# Patient Record
Sex: FEMALE | Race: WHITE | NOT HISPANIC OR LATINO | URBAN - METROPOLITAN AREA
[De-identification: names, ages, dates, MRNs, and addresses within clinical notes are randomized per-mention and may not be internally consistent; named-entity substitution may affect disease eponyms.]

---

## 2018-02-13 ENCOUNTER — OFFICE VISIT CONVERTED (OUTPATIENT)
Dept: FAMILY MEDICINE CLINIC | Facility: CLINIC | Age: 23
End: 2018-02-13
Attending: NURSE PRACTITIONER

## 2018-02-13 ENCOUNTER — CONVERSION ENCOUNTER (OUTPATIENT)
Dept: FAMILY MEDICINE CLINIC | Facility: CLINIC | Age: 23
End: 2018-02-13

## 2018-03-09 ENCOUNTER — CONVERSION ENCOUNTER (OUTPATIENT)
Dept: FAMILY MEDICINE CLINIC | Facility: CLINIC | Age: 23
End: 2018-03-09

## 2018-03-09 ENCOUNTER — OFFICE VISIT CONVERTED (OUTPATIENT)
Dept: FAMILY MEDICINE CLINIC | Facility: CLINIC | Age: 23
End: 2018-03-09
Attending: NURSE PRACTITIONER

## 2018-03-20 ENCOUNTER — CONVERSION ENCOUNTER (OUTPATIENT)
Dept: CARDIOLOGY | Facility: CLINIC | Age: 23
End: 2018-03-20

## 2018-03-20 ENCOUNTER — OFFICE VISIT CONVERTED (OUTPATIENT)
Dept: CARDIOLOGY | Facility: CLINIC | Age: 23
End: 2018-03-20
Attending: INTERNAL MEDICINE

## 2018-05-03 ENCOUNTER — OFFICE VISIT CONVERTED (OUTPATIENT)
Dept: CARDIOLOGY | Facility: CLINIC | Age: 23
End: 2018-05-03
Attending: INTERNAL MEDICINE

## 2018-10-01 ENCOUNTER — OFFICE VISIT CONVERTED (OUTPATIENT)
Dept: FAMILY MEDICINE CLINIC | Facility: CLINIC | Age: 23
End: 2018-10-01
Attending: NURSE PRACTITIONER

## 2018-12-03 ENCOUNTER — CONVERSION ENCOUNTER (OUTPATIENT)
Dept: FAMILY MEDICINE CLINIC | Facility: CLINIC | Age: 23
End: 2018-12-03

## 2018-12-03 ENCOUNTER — OFFICE VISIT CONVERTED (OUTPATIENT)
Dept: FAMILY MEDICINE CLINIC | Facility: CLINIC | Age: 23
End: 2018-12-03
Attending: FAMILY MEDICINE

## 2018-12-13 ENCOUNTER — OFFICE VISIT CONVERTED (OUTPATIENT)
Dept: CARDIOLOGY | Facility: CLINIC | Age: 23
End: 2018-12-13
Attending: INTERNAL MEDICINE

## 2019-02-07 ENCOUNTER — OFFICE VISIT CONVERTED (OUTPATIENT)
Dept: FAMILY MEDICINE CLINIC | Facility: CLINIC | Age: 24
End: 2019-02-07
Attending: FAMILY MEDICINE

## 2019-11-06 ENCOUNTER — HOSPITAL ENCOUNTER (OUTPATIENT)
Dept: FAMILY MEDICINE CLINIC | Facility: CLINIC | Age: 24
Discharge: HOME | End: 2019-11-06
Attending: NURSE PRACTITIONER

## 2019-11-06 ENCOUNTER — OFFICE VISIT CONVERTED (OUTPATIENT)
Dept: FAMILY MEDICINE CLINIC | Facility: CLINIC | Age: 24
End: 2019-11-06
Attending: NURSE PRACTITIONER

## 2019-11-07 ENCOUNTER — HOSPITAL ENCOUNTER (OUTPATIENT)
Dept: OTHER | Facility: HOSPITAL | Age: 24
Discharge: HOME | End: 2019-11-07
Attending: NURSE PRACTITIONER

## 2019-12-09 ENCOUNTER — OFFICE VISIT CONVERTED (OUTPATIENT)
Dept: FAMILY MEDICINE CLINIC | Facility: CLINIC | Age: 24
End: 2019-12-09
Attending: NURSE PRACTITIONER

## 2020-03-05 ENCOUNTER — OFFICE VISIT CONVERTED (OUTPATIENT)
Dept: FAMILY MEDICINE CLINIC | Facility: CLINIC | Age: 25
End: 2020-03-05
Attending: FAMILY MEDICINE

## 2020-03-05 ENCOUNTER — CONVERSION ENCOUNTER (OUTPATIENT)
Dept: FAMILY MEDICINE CLINIC | Facility: CLINIC | Age: 25
End: 2020-03-05

## 2020-05-04 ENCOUNTER — HOSPITAL ENCOUNTER (OUTPATIENT)
Dept: FAMILY MEDICINE CLINIC | Facility: CLINIC | Age: 25
Discharge: HOME | End: 2020-05-04
Attending: FAMILY MEDICINE

## 2020-05-04 ENCOUNTER — TELEMEDICINE CONVERTED (OUTPATIENT)
Dept: FAMILY MEDICINE CLINIC | Facility: CLINIC | Age: 25
End: 2020-05-04
Attending: FAMILY MEDICINE

## 2020-05-05 LAB
ALBUMIN SERPL-MCNC: 4.5 G/DL (ref 3.5–5)
ALBUMIN/GLOB SERPL: 1.6 {RATIO} (ref 1.4–2.6)
ALP SERPL-CCNC: 51 U/L (ref 42–98)
ALT SERPL-CCNC: 23 U/L (ref 10–40)
ANION GAP SERPL CALC-SCNC: 18 MMOL/L (ref 8–19)
AST SERPL-CCNC: 29 U/L (ref 15–50)
BASOPHILS # BLD AUTO: 0.06 10*3/UL (ref 0–0.2)
BASOPHILS NFR BLD AUTO: 1.2 % (ref 0–3)
BILIRUB SERPL-MCNC: 0.65 MG/DL (ref 0.2–1.3)
BUN SERPL-MCNC: 10 MG/DL (ref 5–25)
BUN/CREAT SERPL: 11 {RATIO} (ref 6–20)
CALCIUM SERPL-MCNC: 9.6 MG/DL (ref 8.7–10.4)
CHLORIDE SERPL-SCNC: 102 MMOL/L (ref 99–111)
CONV ABS IMM GRAN: 0.01 10*3/UL (ref 0–0.2)
CONV CO2: 25 MMOL/L (ref 22–32)
CONV IMMATURE GRAN: 0.2 % (ref 0–1.8)
CONV TOTAL PROTEIN: 7.4 G/DL (ref 6.3–8.2)
CREAT UR-MCNC: 0.95 MG/DL (ref 0.5–0.9)
DEPRECATED RDW RBC AUTO: 37.2 FL (ref 36.4–46.3)
EOSINOPHIL # BLD AUTO: 0.46 10*3/UL (ref 0–0.7)
EOSINOPHIL # BLD AUTO: 8.9 % (ref 0–7)
ERYTHROCYTE [DISTWIDTH] IN BLOOD BY AUTOMATED COUNT: 11.6 % (ref 11.7–14.4)
GFR SERPLBLD BASED ON 1.73 SQ M-ARVRAT: >60 ML/MIN/{1.73_M2}
GLOBULIN UR ELPH-MCNC: 2.9 G/DL (ref 2–3.5)
GLUCOSE SERPL-MCNC: 86 MG/DL (ref 65–99)
HCT VFR BLD AUTO: 44.6 % (ref 37–47)
HGB BLD-MCNC: 14.7 G/DL (ref 12–16)
LYMPHOCYTES # BLD AUTO: 1.17 10*3/UL (ref 1–5)
LYMPHOCYTES NFR BLD AUTO: 22.8 % (ref 20–45)
MCH RBC QN AUTO: 28.8 PG (ref 27–31)
MCHC RBC AUTO-ENTMCNC: 33 G/DL (ref 33–37)
MCV RBC AUTO: 87.5 FL (ref 81–99)
MONOCYTES # BLD AUTO: 0.62 10*3/UL (ref 0.2–1.2)
MONOCYTES NFR BLD AUTO: 12.1 % (ref 3–10)
NEUTROPHILS # BLD AUTO: 2.82 10*3/UL (ref 2–8)
NEUTROPHILS NFR BLD AUTO: 54.8 % (ref 30–85)
NRBC CBCN: 0 % (ref 0–0.7)
OSMOLALITY SERPL CALC.SUM OF ELEC: 288 MOSM/KG (ref 273–304)
PLATELET # BLD AUTO: 245 10*3/UL (ref 130–400)
PMV BLD AUTO: 9.2 FL (ref 9.4–12.3)
POTASSIUM SERPL-SCNC: 4.7 MMOL/L (ref 3.5–5.3)
RBC # BLD AUTO: 5.1 10*6/UL (ref 4.2–5.4)
SODIUM SERPL-SCNC: 140 MMOL/L (ref 135–147)
WBC # BLD AUTO: 5.14 10*3/UL (ref 4.8–10.8)

## 2020-05-07 LAB — BACTERIA UR CULT: NORMAL

## 2020-09-16 ENCOUNTER — OFFICE VISIT CONVERTED (OUTPATIENT)
Dept: FAMILY MEDICINE CLINIC | Facility: CLINIC | Age: 25
End: 2020-09-16
Attending: FAMILY MEDICINE

## 2021-05-09 VITALS
HEART RATE: 108 BPM | OXYGEN SATURATION: 98 % | SYSTOLIC BLOOD PRESSURE: 124 MMHG | TEMPERATURE: 96.8 F | DIASTOLIC BLOOD PRESSURE: 80 MMHG | WEIGHT: 222 LBS

## 2021-05-09 VITALS — HEART RATE: 95 BPM | DIASTOLIC BLOOD PRESSURE: 74 MMHG | SYSTOLIC BLOOD PRESSURE: 102 MMHG

## 2021-05-09 VITALS
TEMPERATURE: 97.2 F | WEIGHT: 196 LBS | HEART RATE: 117 BPM | OXYGEN SATURATION: 95 % | SYSTOLIC BLOOD PRESSURE: 118 MMHG | DIASTOLIC BLOOD PRESSURE: 72 MMHG

## 2021-05-09 VITALS
BODY MASS INDEX: 37.15 KG/M2 | TEMPERATURE: 97.8 F | SYSTOLIC BLOOD PRESSURE: 110 MMHG | HEART RATE: 124 BPM | DIASTOLIC BLOOD PRESSURE: 68 MMHG | OXYGEN SATURATION: 96 % | WEIGHT: 223 LBS | HEIGHT: 65 IN

## 2021-05-09 VITALS
TEMPERATURE: 99 F | SYSTOLIC BLOOD PRESSURE: 122 MMHG | HEIGHT: 65 IN | OXYGEN SATURATION: 98 % | WEIGHT: 223 LBS | BODY MASS INDEX: 37.15 KG/M2 | HEART RATE: 89 BPM | DIASTOLIC BLOOD PRESSURE: 72 MMHG

## 2021-05-09 VITALS
TEMPERATURE: 96.8 F | WEIGHT: 228.5 LBS | SYSTOLIC BLOOD PRESSURE: 112 MMHG | DIASTOLIC BLOOD PRESSURE: 70 MMHG | HEART RATE: 99 BPM | OXYGEN SATURATION: 96 %

## 2021-05-09 VITALS
SYSTOLIC BLOOD PRESSURE: 138 MMHG | DIASTOLIC BLOOD PRESSURE: 90 MMHG | OXYGEN SATURATION: 98 % | TEMPERATURE: 97.3 F | HEART RATE: 100 BPM | WEIGHT: 223 LBS

## 2021-05-09 VITALS
HEART RATE: 140 BPM | OXYGEN SATURATION: 98 % | WEIGHT: 221.37 LBS | DIASTOLIC BLOOD PRESSURE: 70 MMHG | TEMPERATURE: 97.1 F | SYSTOLIC BLOOD PRESSURE: 116 MMHG

## 2021-05-09 VITALS
OXYGEN SATURATION: 98 % | WEIGHT: 226 LBS | TEMPERATURE: 96.9 F | DIASTOLIC BLOOD PRESSURE: 80 MMHG | SYSTOLIC BLOOD PRESSURE: 120 MMHG | HEART RATE: 108 BPM

## 2021-05-11 VITALS
TEMPERATURE: 97.4 F | WEIGHT: 193 LBS | HEART RATE: 96 BPM | HEIGHT: 65 IN | SYSTOLIC BLOOD PRESSURE: 108 MMHG | OXYGEN SATURATION: 98 % | BODY MASS INDEX: 32.15 KG/M2 | DIASTOLIC BLOOD PRESSURE: 72 MMHG

## 2021-05-15 VITALS
DIASTOLIC BLOOD PRESSURE: 84 MMHG | WEIGHT: 223 LBS | BODY MASS INDEX: 37.15 KG/M2 | HEIGHT: 65 IN | SYSTOLIC BLOOD PRESSURE: 116 MMHG | HEART RATE: 82 BPM

## 2021-05-16 VITALS
HEART RATE: 92 BPM | BODY MASS INDEX: 34.32 KG/M2 | HEIGHT: 65 IN | WEIGHT: 206 LBS | SYSTOLIC BLOOD PRESSURE: 120 MMHG | DIASTOLIC BLOOD PRESSURE: 88 MMHG

## 2021-05-16 VITALS
SYSTOLIC BLOOD PRESSURE: 120 MMHG | HEIGHT: 65 IN | WEIGHT: 202 LBS | DIASTOLIC BLOOD PRESSURE: 80 MMHG | BODY MASS INDEX: 33.66 KG/M2 | HEART RATE: 92 BPM

## 2021-08-27 ENCOUNTER — OFFICE VISIT (OUTPATIENT)
Dept: FAMILY MEDICINE CLINIC | Facility: CLINIC | Age: 26
End: 2021-08-27

## 2021-08-27 VITALS
OXYGEN SATURATION: 95 % | HEART RATE: 81 BPM | TEMPERATURE: 97.7 F | WEIGHT: 223.4 LBS | DIASTOLIC BLOOD PRESSURE: 82 MMHG | SYSTOLIC BLOOD PRESSURE: 120 MMHG | BODY MASS INDEX: 37.18 KG/M2

## 2021-08-27 DIAGNOSIS — R10.12 LUQ ABDOMINAL PAIN: ICD-10-CM

## 2021-08-27 DIAGNOSIS — N39.0 URINARY TRACT INFECTION WITHOUT HEMATURIA, SITE UNSPECIFIED: ICD-10-CM

## 2021-08-27 DIAGNOSIS — E04.1 THYROID NODULE: Primary | ICD-10-CM

## 2021-08-27 DIAGNOSIS — J02.9 PHARYNGITIS, UNSPECIFIED ETIOLOGY: ICD-10-CM

## 2021-08-27 DIAGNOSIS — N83.202 CYST OF LEFT OVARY: ICD-10-CM

## 2021-08-27 DIAGNOSIS — H66.92 LEFT OTITIS MEDIA, UNSPECIFIED OTITIS MEDIA TYPE: ICD-10-CM

## 2021-08-27 DIAGNOSIS — R16.1 SPLENOMEGALY: ICD-10-CM

## 2021-08-27 PROBLEM — J45.909 ASTHMA: Status: ACTIVE | Noted: 2017-02-16

## 2021-08-27 PROBLEM — F32.A DEPRESSION: Status: ACTIVE | Noted: 2021-08-27

## 2021-08-27 PROBLEM — J30.2 SEASONAL ALLERGIC RHINITIS: Status: ACTIVE | Noted: 2021-08-27

## 2021-08-27 PROBLEM — R55 NEUROCARDIOGENIC SYNCOPE: Status: ACTIVE | Noted: 2018-09-20

## 2021-08-27 PROBLEM — F41.9 ANXIETY: Status: ACTIVE | Noted: 2021-08-27

## 2021-08-27 PROBLEM — Z97.5 IUD (INTRAUTERINE DEVICE) IN PLACE: Status: ACTIVE | Noted: 2017-11-17

## 2021-08-27 PROBLEM — K21.9 GERD (GASTROESOPHAGEAL REFLUX DISEASE): Status: ACTIVE | Noted: 2021-08-27

## 2021-08-27 PROBLEM — G43.909 MIGRAINES: Status: ACTIVE | Noted: 2021-08-27

## 2021-08-27 LAB
ALBUMIN SERPL-MCNC: 4.7 G/DL (ref 3.5–5.2)
ALBUMIN/GLOB SERPL: 1.6 G/DL
ALP SERPL-CCNC: 53 U/L (ref 39–117)
ALT SERPL W P-5'-P-CCNC: 17 U/L (ref 1–33)
ANION GAP SERPL CALCULATED.3IONS-SCNC: 8.2 MMOL/L (ref 5–15)
AST SERPL-CCNC: 20 U/L (ref 1–32)
BASOPHILS # BLD AUTO: 0.04 10*3/MM3 (ref 0–0.2)
BASOPHILS NFR BLD AUTO: 0.8 % (ref 0–1.5)
BILIRUB BLD-MCNC: NEGATIVE MG/DL
BILIRUB SERPL-MCNC: 0.5 MG/DL (ref 0–1.2)
BUN SERPL-MCNC: 9 MG/DL (ref 6–20)
BUN/CREAT SERPL: 8.9 (ref 7–25)
CALCIUM SPEC-SCNC: 9.7 MG/DL (ref 8.6–10.5)
CHLORIDE SERPL-SCNC: 101 MMOL/L (ref 98–107)
CLARITY, POC: CLEAR
CO2 SERPL-SCNC: 25.8 MMOL/L (ref 22–29)
COLOR UR: ABNORMAL
CREAT SERPL-MCNC: 1.01 MG/DL (ref 0.57–1)
DEPRECATED RDW RBC AUTO: 38.3 FL (ref 37–54)
EOSINOPHIL # BLD AUTO: 0.3 10*3/MM3 (ref 0–0.4)
EOSINOPHIL NFR BLD AUTO: 6.2 % (ref 0.3–6.2)
ERYTHROCYTE [DISTWIDTH] IN BLOOD BY AUTOMATED COUNT: 11.8 % (ref 12.3–15.4)
GFR SERPL CREATININE-BSD FRML MDRD: 65 ML/MIN/1.73
GLOBULIN UR ELPH-MCNC: 3 GM/DL
GLUCOSE SERPL-MCNC: 89 MG/DL (ref 65–99)
GLUCOSE UR STRIP-MCNC: NEGATIVE MG/DL
HCT VFR BLD AUTO: 46.5 % (ref 34–46.6)
HGB BLD-MCNC: 15.8 G/DL (ref 12–15.9)
IMM GRANULOCYTES # BLD AUTO: 0.01 10*3/MM3 (ref 0–0.05)
IMM GRANULOCYTES NFR BLD AUTO: 0.2 % (ref 0–0.5)
KETONES UR QL: NEGATIVE
LEUKOCYTE EST, POC: ABNORMAL
LYMPHOCYTES # BLD AUTO: 1.76 10*3/MM3 (ref 0.7–3.1)
LYMPHOCYTES NFR BLD AUTO: 36.1 % (ref 19.6–45.3)
MCH RBC QN AUTO: 29.8 PG (ref 26.6–33)
MCHC RBC AUTO-ENTMCNC: 34 G/DL (ref 31.5–35.7)
MCV RBC AUTO: 87.6 FL (ref 79–97)
MONOCYTES # BLD AUTO: 0.47 10*3/MM3 (ref 0.1–0.9)
MONOCYTES NFR BLD AUTO: 9.7 % (ref 5–12)
NEUTROPHILS NFR BLD AUTO: 2.29 10*3/MM3 (ref 1.7–7)
NEUTROPHILS NFR BLD AUTO: 47 % (ref 42.7–76)
NITRITE UR-MCNC: NEGATIVE MG/ML
NRBC BLD AUTO-RTO: 0 /100 WBC (ref 0–0.2)
PH UR: 5.5 [PH] (ref 5–8)
PLATELET # BLD AUTO: 252 10*3/MM3 (ref 140–450)
PMV BLD AUTO: 8.9 FL (ref 6–12)
POTASSIUM SERPL-SCNC: 3.9 MMOL/L (ref 3.5–5.2)
PROT SERPL-MCNC: 7.7 G/DL (ref 6–8.5)
PROT UR STRIP-MCNC: NEGATIVE MG/DL
RBC # BLD AUTO: 5.31 10*6/MM3 (ref 3.77–5.28)
RBC # UR STRIP: NEGATIVE /UL
SODIUM SERPL-SCNC: 135 MMOL/L (ref 136–145)
SP GR UR: 1.02 (ref 1–1.03)
T4 FREE SERPL-MCNC: 1.05 NG/DL (ref 0.93–1.7)
TSH SERPL DL<=0.05 MIU/L-ACNC: 1.12 UIU/ML (ref 0.27–4.2)
UROBILINOGEN UR QL: NORMAL
WBC # BLD AUTO: 4.87 10*3/MM3 (ref 3.4–10.8)

## 2021-08-27 PROCEDURE — 96372 THER/PROPH/DIAG INJ SC/IM: CPT | Performed by: FAMILY MEDICINE

## 2021-08-27 PROCEDURE — 86664 EPSTEIN-BARR NUCLEAR ANTIGEN: CPT | Performed by: FAMILY MEDICINE

## 2021-08-27 PROCEDURE — 84439 ASSAY OF FREE THYROXINE: CPT | Performed by: FAMILY MEDICINE

## 2021-08-27 PROCEDURE — 85025 COMPLETE CBC W/AUTO DIFF WBC: CPT | Performed by: FAMILY MEDICINE

## 2021-08-27 PROCEDURE — 84443 ASSAY THYROID STIM HORMONE: CPT | Performed by: FAMILY MEDICINE

## 2021-08-27 PROCEDURE — U0003 INFECTIOUS AGENT DETECTION BY NUCLEIC ACID (DNA OR RNA); SEVERE ACUTE RESPIRATORY SYNDROME CORONAVIRUS 2 (SARS-COV-2) (CORONAVIRUS DISEASE [COVID-19]), AMPLIFIED PROBE TECHNIQUE, MAKING USE OF HIGH THROUGHPUT TECHNOLOGIES AS DESCRIBED BY CMS-2020-01-R: HCPCS | Performed by: FAMILY MEDICINE

## 2021-08-27 PROCEDURE — 99214 OFFICE O/P EST MOD 30 MIN: CPT | Performed by: FAMILY MEDICINE

## 2021-08-27 PROCEDURE — 80053 COMPREHEN METABOLIC PANEL: CPT | Performed by: FAMILY MEDICINE

## 2021-08-27 PROCEDURE — 87086 URINE CULTURE/COLONY COUNT: CPT | Performed by: FAMILY MEDICINE

## 2021-08-27 PROCEDURE — 86665 EPSTEIN-BARR CAPSID VCA: CPT | Performed by: FAMILY MEDICINE

## 2021-08-27 RX ORDER — CLINDAMYCIN HYDROCHLORIDE 300 MG/1
300 CAPSULE ORAL 4 TIMES DAILY
Qty: 28 CAPSULE | Refills: 0 | Status: SHIPPED | OUTPATIENT
Start: 2021-08-27 | End: 2021-09-03

## 2021-08-27 RX ORDER — HYDROCODONE BITARTRATE AND ACETAMINOPHEN 7.5; 325 MG/1; MG/1
TABLET ORAL
COMMUNITY
Start: 2021-08-25 | End: 2021-10-20

## 2021-08-27 RX ORDER — SULFAMETHOXAZOLE AND TRIMETHOPRIM 800; 160 MG/1; MG/1
TABLET ORAL
COMMUNITY
Start: 2021-08-25 | End: 2021-10-20

## 2021-08-27 RX ORDER — IBUPROFEN 800 MG/1
800 TABLET ORAL EVERY 8 HOURS PRN
Qty: 21 TABLET | Refills: 0 | Status: SHIPPED | OUTPATIENT
Start: 2021-08-27 | End: 2021-11-16

## 2021-08-27 RX ORDER — SACCHAROMYCES BOULARDII 250 MG
250 CAPSULE ORAL 2 TIMES DAILY
Qty: 20 CAPSULE | Refills: 0 | Status: SHIPPED | OUTPATIENT
Start: 2021-08-27 | End: 2021-09-06

## 2021-08-27 RX ORDER — ONDANSETRON 4 MG/1
4 TABLET, FILM COATED ORAL 4 TIMES DAILY PRN
Qty: 28 TABLET | Refills: 1 | Status: SHIPPED | OUTPATIENT
Start: 2021-08-27 | End: 2021-11-16

## 2021-08-27 RX ORDER — FEXOFENADINE HCL 180 MG/1
180 TABLET ORAL DAILY
COMMUNITY

## 2021-08-27 RX ORDER — KETOROLAC TROMETHAMINE 30 MG/ML
60 INJECTION, SOLUTION INTRAMUSCULAR; INTRAVENOUS ONCE
Status: COMPLETED | OUTPATIENT
Start: 2021-08-27 | End: 2021-08-27

## 2021-08-27 RX ADMIN — KETOROLAC TROMETHAMINE 60 MG: 30 INJECTION, SOLUTION INTRAMUSCULAR; INTRAVENOUS at 15:10

## 2021-08-27 NOTE — PROGRESS NOTES
Venipuncture Blood Specimen Collection  Venipuncture performed in left arm by Joann Simental with good hemostasis. Patient tolerated the procedure well without complications.   08/27/21   Joann Simental

## 2021-08-27 NOTE — PROGRESS NOTES
Chief Complaint  Hospital Follow Up Visit ([place], abdominal pain.)    Subjective          Charlotte Paredes presents to [facility]  Pt began having LUQ abdominal pain 3 days ago and was seen at [facility]- pt was diagnosed with UTI, and splenomegaly- which was seen on CT along with thyroid nodule and left 4cm ovarian cyst- pt says she gets a lot of cysts- she said that hospital told her to get it rechecked in 6 weeks  Abdominal Pain  This is a new problem. The current episode started in the past 7 days. The onset quality is sudden. The problem occurs constantly. The problem has been unchanged. The pain is located in the LUQ. The pain is moderate. The quality of the pain is sharp. The abdominal pain does not radiate. Associated symptoms include nausea. Pertinent negatives include no anorexia, arthralgias, belching, constipation, diarrhea, dysuria, fever, flatus, frequency, headaches, hematochezia, melena, myalgias, vomiting or weight loss. Nothing aggravates the pain.       Objective   Allergies   Allergen Reactions   • Cefaclor Hives     Reaction as a child         There is no immunization history on file for this patient.    Vital Signs:   Vitals:    08/27/21 1401   BP: 120/82   Pulse: 81   Temp: 97.7 °F (36.5 °C)   SpO2: 95%   Weight: 101 kg (223 lb 6.4 oz)       Physical Exam  Vitals reviewed.   Constitutional:       Appearance: Normal appearance. She is well-developed.   HENT:      Head: Normocephalic and atraumatic.      Right Ear: Tympanic membrane, ear canal and external ear normal.      Left Ear: External ear normal.      Ears:      Comments: Left TM red, cloudy     Mouth/Throat:      Mouth: Mucous membranes are moist.      Pharynx: Posterior oropharyngeal erythema present. No oropharyngeal exudate.   Eyes:      Extraocular Movements: Extraocular movements intact.      Conjunctiva/sclera: Conjunctivae normal.      Pupils: Pupils are equal, round, and reactive  to light.   Cardiovascular:      Rate and Rhythm: Normal rate and regular rhythm.      Pulses: Normal pulses.      Heart sounds: Normal heart sounds. No murmur heard.   No friction rub. No gallop.    Pulmonary:      Effort: Pulmonary effort is normal.      Breath sounds: Normal breath sounds. No wheezing or rhonchi.   Abdominal:      General: Bowel sounds are normal. There is no distension.      Palpations: Abdomen is soft. There is no mass.      Tenderness: There is abdominal tenderness. There is no right CVA tenderness, left CVA tenderness, guarding or rebound.      Hernia: No hernia is present.      Comments: Tenderness LUQ abdomen   Skin:     General: Skin is warm and dry.      Capillary Refill: Capillary refill takes less than 2 seconds.   Neurological:      Mental Status: She is alert and oriented to person, place, and time.      Cranial Nerves: No cranial nerve deficit.   Psychiatric:         Mood and Affect: Mood and affect normal.         Behavior: Behavior normal.         Thought Content: Thought content normal.         Judgment: Judgment normal.        Result Review :                 Assessment and Plan    Diagnoses and all orders for this visit:    1. Thyroid nodule (Primary)  -     US Thyroid  -     TSH+Free T4    2. Cyst of left ovary  -     US Non-ob Transvaginal; Future    3. Splenomegaly  -     Cancel: Ambulatory Referral to General Surgery  -     EBV Antibody Profile; Future  -     CBC Auto Differential  -     Comprehensive Metabolic Panel  -     EBV Antibody Profile  -     Ambulatory Referral to General Surgery    4. LUQ abdominal pain  -     Cancel: Ambulatory Referral to General Surgery  -     ketorolac (TORADOL) injection 60 mg  -     ibuprofen (ADVIL,MOTRIN) 800 MG tablet; Take 1 tablet by mouth Every 8 (Eight) Hours As Needed for Mild Pain .  Dispense: 21 tablet; Refill: 0  -     Ambulatory Referral to General Surgery  -     ondansetron (Zofran) 4 MG tablet; Take 1 tablet by mouth 4 (Four)  Times a Day As Needed for Nausea or Vomiting.  Dispense: 28 tablet; Refill: 1    5. Urinary tract infection without hematuria, site unspecified  -     Urine Culture - Urine, Urine, Clean Catch  -     POCT urinalysis dipstick, automated    6. Left otitis media, unspecified otitis media type  -     clindamycin (Cleocin) 300 MG capsule; Take 1 capsule by mouth 4 (Four) Times a Day for 7 days.  Dispense: 28 capsule; Refill: 0  -     saccharomyces boulardii (Florastor) 250 MG capsule; Take 1 capsule by mouth 2 (Two) Times a Day for 10 days.  Dispense: 20 capsule; Refill: 0    7. Pharyngitis, unspecified etiology  -     Cancel: POCT SARS-CoV-2 Antigen GARDENIA  -     COVID-19,CEPHEID/PAOLA/BDMAX,COR/AALIYAH/PAD/JULES IN-HOUSE(OR EMERGENT/ADD-ON),NP SWAB IN TRANSPORT MEDIA 3-4 HR TAT, RT-PCR - Swab, Nasopharynx            Follow Up   Return in about 1 week (around 9/3/2021) for Recheck.  Patient was given instructions and counseling regarding her condition or for health maintenance advice. Please see specific information pulled into the AVS if appropriate.   Will stop norco and bactrim DS.  Pt told to go to ER is symptoms worsen at all.

## 2021-08-28 NOTE — PROGRESS NOTES
Labs show no significant abnormalities except for slightly low sodium levels.  Drink plenty of water.  Follow-up in 1-2 weeks to recheck.

## 2021-08-29 LAB
BACTERIA SPEC AEROBE CULT: NO GROWTH
SARS-COV-2 RNA RESP QL NAA+PROBE: DETECTED

## 2021-08-30 DIAGNOSIS — R16.1 SPLENOMEGALY: Primary | ICD-10-CM

## 2021-08-30 LAB
EBV NA IGG SER IA-ACNC: <18 U/ML (ref 0–17.9)
EBV VCA IGG SER IA-ACNC: 87.5 U/ML (ref 0–17.9)
EBV VCA IGM SER IA-ACNC: <36 U/ML (ref 0–35.9)
SERVICE CMNT-IMP: ABNORMAL

## 2021-09-09 ENCOUNTER — HOSPITAL ENCOUNTER (OUTPATIENT)
Dept: ULTRASOUND IMAGING | Facility: HOSPITAL | Age: 26
Discharge: HOME | End: 2021-09-09

## 2021-09-09 DIAGNOSIS — N83.202 CYST OF LEFT OVARY: ICD-10-CM

## 2021-09-09 PROCEDURE — 76536 US EXAM OF HEAD AND NECK: CPT

## 2021-09-09 PROCEDURE — 76830 TRANSVAGINAL US NON-OB: CPT

## 2021-09-09 NOTE — PROGRESS NOTES
Ultrasound showed left functional 2.5cm ovarian cyst which has shrunk from before.  This looks benign.  We can follow-for resolution.  Also, your thyroid ultrasound did show slightly prominent lymph nodes in neck, which could be from covid.  We probably need to look further into these.  You may want to see ENT for neck and we may want to repeat vaginal ultrasound in future to make sure ovarian cyst goes away.  Let me know or follow-up.

## 2021-10-20 ENCOUNTER — OFFICE VISIT (OUTPATIENT)
Dept: GASTROENTEROLOGY | Facility: CLINIC | Age: 26
End: 2021-10-20

## 2021-10-20 ENCOUNTER — TELEPHONE (OUTPATIENT)
Dept: FAMILY MEDICINE CLINIC | Facility: CLINIC | Age: 26
End: 2021-10-20

## 2021-10-20 VITALS
HEIGHT: 65 IN | HEART RATE: 97 BPM | DIASTOLIC BLOOD PRESSURE: 49 MMHG | SYSTOLIC BLOOD PRESSURE: 113 MMHG | WEIGHT: 222 LBS | BODY MASS INDEX: 36.99 KG/M2

## 2021-10-20 DIAGNOSIS — R10.12 LEFT UPPER QUADRANT ABDOMINAL PAIN: ICD-10-CM

## 2021-10-20 DIAGNOSIS — R12 HEARTBURN: Primary | ICD-10-CM

## 2021-10-20 PROCEDURE — 99214 OFFICE O/P EST MOD 30 MIN: CPT | Performed by: NURSE PRACTITIONER

## 2021-10-20 RX ORDER — ONDANSETRON 4 MG/1
TABLET, ORALLY DISINTEGRATING ORAL
COMMUNITY
Start: 2021-09-17 | End: 2021-10-20

## 2021-10-20 RX ORDER — ALBUTEROL SULFATE 90 UG/1
AEROSOL, METERED RESPIRATORY (INHALATION)
COMMUNITY
Start: 2021-09-16 | End: 2021-11-16

## 2021-10-20 RX ORDER — PANTOPRAZOLE SODIUM 20 MG/1
20 TABLET, DELAYED RELEASE ORAL DAILY
Qty: 30 TABLET | Refills: 3 | Status: SHIPPED | OUTPATIENT
Start: 2021-10-20 | End: 2021-11-22

## 2021-10-20 NOTE — TELEPHONE ENCOUNTER
Caller:     Relationship:     Best call back number: [phone]    What is the medical concern/diagnosis: SPLEEN    What specialty or service is being requested:   GENERAL SURGEON OR HEMATOLOGIST    Any additional details:     PATIENT STATED PCP WAS SUPPOSED TO REFER HER TO A GENERAL SURGEON OR A HEMATOLOGIST FOR HER SPLEEN. SHE  STATED SHE ALSO WANT AN UPDATE ABOUT HER THYROID. PATIENT WANTS TO KNOW IF A REFERRAL TO A SPECIALIST WILL BE NEEDED FOR HER THYROID AS WELL.

## 2021-10-21 DIAGNOSIS — R59.0 ENLARGED LYMPH NODE IN NECK: Primary | ICD-10-CM

## 2021-10-21 NOTE — TELEPHONE ENCOUNTER
Call pt: Pt had been messaged to let me know if she wanted to see an ENT for her neck or follow-up in the office to discuss.  She did neither of these things.  Pt probably needs to follow-up if she has any questions.  I referred to ENT for her lymph nodes in her neck since she would like to follow-up on these.  I had referred her to general surgery for her spleen but general surgery said that she needed to see GI for this.  I then referred her to GI.  It looks like scheduling had left a voice mail message for pt regarding this, according to the communication in the referral.  Please see the referral order for further info on this.  If she has not followed up with GI for her spleen then she will need to contact Lay Arrieta to see what can be done about this.  Let her know that I completed all my tasks regarding these things.  If she has any questions, please follow-up in the office.  Thanks.

## 2021-11-12 ENCOUNTER — OFFICE VISIT (OUTPATIENT)
Dept: OTOLARYNGOLOGY | Facility: CLINIC | Age: 26
End: 2021-11-12

## 2021-11-12 VITALS — HEIGHT: 65 IN | TEMPERATURE: 97.3 F | WEIGHT: 229.4 LBS | BODY MASS INDEX: 38.22 KG/M2

## 2021-11-12 DIAGNOSIS — J35.1 TONSILLAR HYPERTROPHY: ICD-10-CM

## 2021-11-12 DIAGNOSIS — J03.91 RECURRENT TONSILLITIS: ICD-10-CM

## 2021-11-12 DIAGNOSIS — E04.1 THYROID NODULE: Primary | ICD-10-CM

## 2021-11-12 DIAGNOSIS — J30.9 ALLERGIC RHINITIS, UNSPECIFIED SEASONALITY, UNSPECIFIED TRIGGER: ICD-10-CM

## 2021-11-12 DIAGNOSIS — H93.293 ABNORMAL AUDITORY PERCEPTION OF BOTH EARS: ICD-10-CM

## 2021-11-12 PROCEDURE — 99204 OFFICE O/P NEW MOD 45 MIN: CPT | Performed by: NURSE PRACTITIONER

## 2021-11-12 RX ORDER — DEXTROMETHORPHAN HYDROBROMIDE AND PROMETHAZINE HYDROCHLORIDE 15; 6.25 MG/5ML; MG/5ML
SYRUP ORAL
COMMUNITY
Start: 2021-09-17 | End: 2021-11-16

## 2021-11-12 RX ORDER — METHYLPREDNISOLONE 4 MG/1
TABLET ORAL
COMMUNITY
Start: 2021-09-16 | End: 2021-11-16

## 2021-11-12 RX ORDER — FLUTICASONE PROPIONATE 50 MCG
2 SPRAY, SUSPENSION (ML) NASAL DAILY
Qty: 16 G | Refills: 2 | Status: SHIPPED | OUTPATIENT
Start: 2021-11-12 | End: 2021-11-16

## 2021-11-12 NOTE — PATIENT INSTRUCTIONS
Neilmed Saline Nasal Rinse  [email or web address]/directions-for-use.php        Step 1  Please wash your hands. Fill the clean bottle with the designated volume of either distilled, micro-filtered (through 0.2 micron filter), commercially bottled or previously boiled and cooled down water. Always rinse your nasal passages with NeilMed® Sinus Rinse™ packets only. Our packets contain a mixture of USP grade sodium chloride and sodium bicarbonate. These ingredients are of the purest quality available to make the dry powder mixture. Rinsing your nasal passages with only plain water without our mixture will result in a severe burning sensation as the plain water is not physiologic for your nasal lining, even if it is appropriate for drinking. Additionally, for your safety, do not use tap or faucet water for dissolving the mixture unless it has been previously boiled for five minutes or more as boiling sterilizes the water. Other choices are distilled, micro-filtered (through 0.2 micron), commercially bottled or, as mentioned earlier, previously boiled water at lukewarm or body temperature. You can store boiled water in a clean container for seven days or more if refrigerated. Do not use non-chlorinated or non-ultra (0.2 micron) filtered well water unless it is boiled and then cooled to lukewarm or body temperature.  *You may warm the water in a microwave in increments of 5 to 10 seconds to avoid overheating the water, damaging the device or scalding your nasal passage.   Step 2  Cut the Sinus Rinse™ mixture packet at the corner and pour its contents into the bottle. Tighten the cap and tube on the bottle securely. Place one finger over the tip of the cap and shake the bottle gently to dissolve the mixture.   Step 3  Standing in front of a sink, bend forward to your comfort level and tilt your head down. Keeping your mouth open, without holding your breath, place the cap snugly against your nasal passage. SQUEEZE  BOTTLE GENTLY until the solution starts draining from the OPPOSITE nasal passage. Some may drain from your mouth. For a proper rinse, keep squeezing the bottle GENTLY until at least 1/4 to 1/2 (60 mL to 120 mL or 2 to 4 fl oz) of the bottle is used. Do not swallow the solution.   Step 4  Blow your nose very gently, without pinching nose completely to avoid pressure on eardrums. If tolerable, sniff in gently any residual solution remaining in the nasal passage once or twice, because this may clean out the posterior nasopharyngeal area, which is the area at the back of your nasal passage. At times, some solution will reach the back of your throat, so please spit it out. To help drain any residual solution, blow your nose gently while tilting your head forward and to the opposite side of the nasal passage you just rinsed.  Step 5  Now repeat steps 3 and 4 for your other nasal passage. Most users fi nd that rinsing twice a day is beneficial, similar to brushing your teeth. Many doctors recommend rinsing 3-4 times daily or for special circumstances, even rinsing up to 6 times a day is safe. Please follow your physician’s advice.

## 2021-11-12 NOTE — PROGRESS NOTES
Patient Name: Charlotte Paredes   Visit Date: 11/12/2021   Patient ID: [id number]  Provider: OCTAVIO Denton    Sex: female  Location: [facility]   YOB: 1995  Location Address: [address] [place] [address], [address] [place] [address],?[id number]    Primary Care Provider Sergio Torres MD  Location Phone: [phone]    Referring Provider: Sergio Torres MD        Chief Complaint  Other (enlarged lymph nodes) and Thyroid Problem    Subjective   Charlotte Paredes is a 26 y.o. female who presents to [facility] today as a consult from Sergio Torres MD for evaluation of thyroid nodule and enlarged cervical lymph nodes.  Patient reports over the past year she has had an increase in fatigue and weight gain.  In August of this year she was diagnosed with Covid.  A CT scan showed mild splenomegaly and labs indicated a past infection of Padmini-Barr virus.  She did have a thyroid ultrasound performed on 9/9/2021 which showed a 9 mm mixed echotexture nodule in the mid left lobe.  This was classified as T RADS 2, not suspicious.  Additionally noted were bilateral prominent cervical chain lymph nodes with the largest on the left measuring 2.2 cm and the largest on the right measuring 1.8 cm.  These were nonspecific but may be reactive based on the patient's recent COVID-19 positivity.  She denies any previous issues with her thyroid.  Thyroid labs drawn on 8/27/2021 showed normal TSH and free T4 levels.  She denies any compressive symptoms or overlying skin changes.  She denies any family history of thyroid cancer or personal radiation exposure.    Additionally, she does note that she has frequent tonsillitis and strep throat infections.  She reports that her tonsils are frequently enlarged, painful with frequent tonsil stones.  She states that she gets 4-5 tonsillitis infections per year and this has been ongoing for many years since she was a teenager.  She  "reports frequent right tinnitus, postnasal drainage and sinusitis.  She has not doing any nasal sprays or rinses.    Current Outpatient Medications on File Prior to Visit   Medication Sig   • albuterol sulfate HFA (ProAir HFA) 108 (90 Base) MCG/ACT inhaler    • fexofenadine (Allegra Allergy) 180 MG tablet Allegra Allergy 180 mg oral tablet take 1 tablet (180 mg) by oral route once daily   Active   • levonorgestrel (MIRENA) 20 MCG/24HR IUD 1 each by Intrauterine route.   • methylPREDNISolone (MEDROL) 4 MG dose pack    • pantoprazole (PROTONIX) 20 MG EC tablet Take 1 tablet by mouth Daily.   • promethazine-dextromethorphan (PROMETHAZINE-DM) 6.25-15 MG/5ML syrup    • ibuprofen (ADVIL,MOTRIN) 800 MG tablet Take 1 tablet by mouth Every 8 (Eight) Hours As Needed for Mild Pain .   • ondansetron (Zofran) 4 MG tablet Take 1 tablet by mouth 4 (Four) Times a Day As Needed for Nausea or Vomiting.     No current facility-administered medications on file prior to visit.        Social History     Tobacco Use   • Smoking status: Never Smoker   • Smokeless tobacco: Never Used   Vaping Use   • Vaping Use: Never used   Substance Use Topics   • Alcohol use: Never   • Drug use: Never       Objective     Vital Signs:   Temp 97.3 °F (36.3 °C) (Temporal)   Ht 165.1 cm (65\")   Wt 104 kg (229 lb 6.4 oz)   BMI 38.17 kg/m²       Physical Exam  Constitutional:       General: She is not in acute distress.     Appearance: Normal appearance. She is not ill-appearing.   HENT:      Head: Normocephalic and atraumatic.      Jaw: There is normal jaw occlusion. No tenderness or pain on movement.      Salivary Glands: Right salivary gland is not diffusely enlarged or tender. Left salivary gland is not diffusely enlarged or tender.      Right Ear: Tympanic membrane, ear canal and external ear normal.      Left Ear: Tympanic membrane, ear canal and external ear normal.      Nose: Nose normal. No septal deviation.      Right Sinus: No maxillary sinus " tenderness or frontal sinus tenderness.      Left Sinus: No maxillary sinus tenderness or frontal sinus tenderness.      Mouth/Throat:      Lips: Pink. No lesions.      Mouth: Mucous membranes are moist. No oral lesions.      Dentition: Normal dentition.      Tongue: No lesions.      Palate: No mass and lesions.      Pharynx: Oropharynx is clear. Uvula midline.      Tonsils: No tonsillar exudate. 4+ on the right. 4+ on the left.      Comments: Tonsils are enlarged bilaterally, cryptic and erythematous.  Eyes:      Extraocular Movements: Extraocular movements intact.      Conjunctiva/sclera: Conjunctivae normal.      Pupils: Pupils are equal, round, and reactive to light.   Neck:      Thyroid: No thyroid mass, thyromegaly or thyroid tenderness.      Trachea: Trachea normal.   Pulmonary:      Effort: Pulmonary effort is normal. No respiratory distress.   Musculoskeletal:         General: Normal range of motion.      Cervical back: Normal range of motion and neck supple. No tenderness.   Lymphadenopathy:      Cervical: No cervical adenopathy.   Skin:     General: Skin is warm and dry.   Neurological:      General: No focal deficit present.      Mental Status: She is alert and oriented to person, place, and time.      Cranial Nerves: No cranial nerve deficit.      Motor: No weakness.      Gait: Gait normal.   Psychiatric:         Mood and Affect: Mood normal.         Behavior: Behavior normal.         Thought Content: Thought content normal.         Judgment: Judgment normal.               Result Review :         US Thyroid (09/09/2021 13:30)  TSH+Free T4 (08/27/2021 14:44)  EBV Antibody Profile (08/27/2021 14:41)  See HPI     Assessment and Plan    Diagnoses and all orders for this visit:    1. Thyroid nodule (Primary)  -     COVID PRE-OP / PRE-PROCEDURE SCREENING ORDER (NO ISOLATION) - Swab, Nasopharynx; Future  -     Case Request; Standing  -     Case Request    2. Recurrent tonsillitis  -     COVID PRE-OP /  PRE-PROCEDURE SCREENING ORDER (NO ISOLATION) - Swab, Nasopharynx; Future  -     Case Request; Standing  -     Case Request    3. Tonsillar hypertrophy  -     COVID PRE-OP / PRE-PROCEDURE SCREENING ORDER (NO ISOLATION) - Swab, Nasopharynx; Future  -     Case Request; Standing  -     Case Request    4. Abnormal auditory perception of both ears  -     COVID PRE-OP / PRE-PROCEDURE SCREENING ORDER (NO ISOLATION) - Swab, Nasopharynx; Future  -     Case Request; Standing  -     Case Request    5. Allergic rhinitis, unspecified seasonality, unspecified trigger  -     COVID PRE-OP / PRE-PROCEDURE SCREENING ORDER (NO ISOLATION) - Swab, Nasopharynx; Future  -     Case Request; Standing  -     Case Request  -     fluticasone (FLONASE) 50 MCG/ACT nasal spray; 2 sprays into the nostril(s) as directed by provider Daily for 30 days.  Dispense: 16 g; Refill: 2    Other orders  -     Follow Anesthesia Guidelines / Protocol; Future  -     Obtain Informed Consent; Future    On examination today I was able to go over the results of her thyroid ultrasound.  We will schedule repeat thyroid ultrasound in 12 months.  Based on her tonsillar enlargement, frequent infections and tonsil stones she is a good candidate for tonsillectomy and possible adenoidectomy.  Risk and benefits of tonsillectomy and adenoidectomy as well as possible complications and alternatives were discussed with the patient.  She also met with  who examined her.  She would like to proceed with tonsillectomy we will get this scheduled at her convenience.  We will also have her start on a daily nasal and sinus rinse and start on fluticasone nasal spray as well.  I will plan to see her back 6 weeks postop.       Follow Up   Return 6 weeks post op.  Patient was given instructions and counseling regarding her condition or for health maintenance advice. Please see specific information pulled into the AVS if appropriate.      Radha Barr, OCTAVIO

## 2021-11-15 ENCOUNTER — TELEPHONE (OUTPATIENT)
Dept: CARDIOLOGY | Facility: CLINIC | Age: 26
End: 2021-11-15

## 2021-11-15 NOTE — TELEPHONE ENCOUNTER
Received surgical clearance form.Patient has not been seen since 2018. Advised patient will need appointment before can be cleared.

## 2021-11-19 ENCOUNTER — TELEPHONE (OUTPATIENT)
Dept: OTOLARYNGOLOGY | Facility: CLINIC | Age: 26
End: 2021-11-19

## 2021-11-19 NOTE — TELEPHONE ENCOUNTER
Spoke with patient to see if she had made an appointment with Dr Vegas office (cardio) to get clearance before her surgery on Dec 3rd 2021 patient stated she had not done that yet and she would like to postpone her surgery until 2022 until she can get that done. I advised the patient to give our office a call when she was ready to proceed.

## 2021-11-22 ENCOUNTER — OFFICE VISIT (OUTPATIENT)
Dept: FAMILY MEDICINE CLINIC | Facility: CLINIC | Age: 26
End: 2021-11-22

## 2021-11-22 VITALS
DIASTOLIC BLOOD PRESSURE: 80 MMHG | SYSTOLIC BLOOD PRESSURE: 100 MMHG | TEMPERATURE: 98.1 F | OXYGEN SATURATION: 99 % | HEART RATE: 100 BPM

## 2021-11-22 DIAGNOSIS — J02.9 PHARYNGITIS, UNSPECIFIED ETIOLOGY: ICD-10-CM

## 2021-11-22 DIAGNOSIS — R10.9 LEFT SIDED ABDOMINAL PAIN: ICD-10-CM

## 2021-11-22 DIAGNOSIS — N30.00 ACUTE CYSTITIS WITHOUT HEMATURIA: ICD-10-CM

## 2021-11-22 DIAGNOSIS — R05.9 COUGHING: Primary | ICD-10-CM

## 2021-11-22 DIAGNOSIS — R10.84 ABDOMINAL PAIN, ACUTE, GENERALIZED: ICD-10-CM

## 2021-11-22 LAB
ALBUMIN SERPL-MCNC: 4.5 G/DL (ref 3.5–5.2)
ALBUMIN/GLOB SERPL: 1.5 G/DL
ALP SERPL-CCNC: 57 U/L (ref 39–117)
ALT SERPL W P-5'-P-CCNC: 18 U/L (ref 1–33)
ANION GAP SERPL CALCULATED.3IONS-SCNC: 8 MMOL/L (ref 5–15)
AST SERPL-CCNC: 23 U/L (ref 1–32)
B-HCG UR QL: NEGATIVE
BASOPHILS # BLD AUTO: 0.06 10*3/MM3 (ref 0–0.2)
BASOPHILS NFR BLD AUTO: 1 % (ref 0–1.5)
BILIRUB BLD-MCNC: NEGATIVE MG/DL
BILIRUB SERPL-MCNC: 0.3 MG/DL (ref 0–1.2)
BUN SERPL-MCNC: 9 MG/DL (ref 6–20)
BUN/CREAT SERPL: 10.6 (ref 7–25)
CALCIUM SPEC-SCNC: 9.8 MG/DL (ref 8.6–10.5)
CHLORIDE SERPL-SCNC: 104 MMOL/L (ref 98–107)
CLARITY, POC: CLEAR
CO2 SERPL-SCNC: 26 MMOL/L (ref 22–29)
COLOR UR: YELLOW
CREAT SERPL-MCNC: 0.85 MG/DL (ref 0.57–1)
DEPRECATED RDW RBC AUTO: 37.8 FL (ref 37–54)
EOSINOPHIL # BLD AUTO: 0.33 10*3/MM3 (ref 0–0.4)
EOSINOPHIL NFR BLD AUTO: 5.4 % (ref 0.3–6.2)
ERYTHROCYTE [DISTWIDTH] IN BLOOD BY AUTOMATED COUNT: 12 % (ref 12.3–15.4)
EXPIRATION DATE: ABNORMAL
EXPIRATION DATE: NORMAL
EXPIRATION DATE: NORMAL
GFR SERPL CREATININE-BSD FRML MDRD: 80 ML/MIN/1.73
GLOBULIN UR ELPH-MCNC: 3.1 GM/DL
GLUCOSE SERPL-MCNC: 88 MG/DL (ref 65–99)
GLUCOSE UR STRIP-MCNC: NEGATIVE MG/DL
HCT VFR BLD AUTO: 42.4 % (ref 34–46.6)
HGB BLD-MCNC: 14.9 G/DL (ref 12–15.9)
IMM GRANULOCYTES # BLD AUTO: 0.03 10*3/MM3 (ref 0–0.05)
IMM GRANULOCYTES NFR BLD AUTO: 0.5 % (ref 0–0.5)
INTERNAL CONTROL: NORMAL
INTERNAL NEGATIVE CONTROL: NORMAL
INTERNAL POSITIVE CONTROL: NORMAL
KETONES UR QL: NEGATIVE
LEUKOCYTE EST, POC: ABNORMAL
LYMPHOCYTES # BLD AUTO: 1.89 10*3/MM3 (ref 0.7–3.1)
LYMPHOCYTES NFR BLD AUTO: 30.8 % (ref 19.6–45.3)
Lab: ABNORMAL
Lab: NORMAL
Lab: NORMAL
MCH RBC QN AUTO: 30.3 PG (ref 26.6–33)
MCHC RBC AUTO-ENTMCNC: 35.1 G/DL (ref 31.5–35.7)
MCV RBC AUTO: 86.4 FL (ref 79–97)
MONOCYTES # BLD AUTO: 0.66 10*3/MM3 (ref 0.1–0.9)
MONOCYTES NFR BLD AUTO: 10.8 % (ref 5–12)
NEUTROPHILS NFR BLD AUTO: 3.16 10*3/MM3 (ref 1.7–7)
NEUTROPHILS NFR BLD AUTO: 51.5 % (ref 42.7–76)
NITRITE UR-MCNC: NEGATIVE MG/ML
NRBC BLD AUTO-RTO: 0 /100 WBC (ref 0–0.2)
PH UR: 5 [PH] (ref 5–8)
PLATELET # BLD AUTO: 293 10*3/MM3 (ref 140–450)
PMV BLD AUTO: 9.2 FL (ref 6–12)
POTASSIUM SERPL-SCNC: 4.4 MMOL/L (ref 3.5–5.2)
PROT SERPL-MCNC: 7.6 G/DL (ref 6–8.5)
PROT UR STRIP-MCNC: NEGATIVE MG/DL
RBC # BLD AUTO: 4.91 10*6/MM3 (ref 3.77–5.28)
RBC # UR STRIP: NEGATIVE /UL
SARS-COV-2 AG UPPER RESP QL IA.RAPID: NOT DETECTED
SARS-COV-2 N GENE RESP QL NAA+PROBE: NOT DETECTED
SODIUM SERPL-SCNC: 138 MMOL/L (ref 136–145)
SP GR UR: 1.02 (ref 1–1.03)
UROBILINOGEN UR QL: NORMAL
WBC NRBC COR # BLD: 6.13 10*3/MM3 (ref 3.4–10.8)

## 2021-11-22 PROCEDURE — 80053 COMPREHEN METABOLIC PANEL: CPT | Performed by: FAMILY MEDICINE

## 2021-11-22 PROCEDURE — 87635 SARS-COV-2 COVID-19 AMP PRB: CPT | Performed by: FAMILY MEDICINE

## 2021-11-22 PROCEDURE — 85025 COMPLETE CBC W/AUTO DIFF WBC: CPT | Performed by: FAMILY MEDICINE

## 2021-11-22 PROCEDURE — 87086 URINE CULTURE/COLONY COUNT: CPT | Performed by: FAMILY MEDICINE

## 2021-11-22 PROCEDURE — 81025 URINE PREGNANCY TEST: CPT | Performed by: FAMILY MEDICINE

## 2021-11-22 PROCEDURE — 87426 SARSCOV CORONAVIRUS AG IA: CPT | Performed by: FAMILY MEDICINE

## 2021-11-22 PROCEDURE — 99214 OFFICE O/P EST MOD 30 MIN: CPT | Performed by: FAMILY MEDICINE

## 2021-11-22 RX ORDER — PANTOPRAZOLE SODIUM 40 MG/1
40 TABLET, DELAYED RELEASE ORAL DAILY
Qty: 10 TABLET | Refills: 0 | Status: SHIPPED | OUTPATIENT
Start: 2021-11-22 | End: 2022-03-24 | Stop reason: SDUPTHER

## 2021-11-22 RX ORDER — PREDNISONE 20 MG/1
40 TABLET ORAL DAILY
Qty: 10 TABLET | Refills: 0 | Status: SHIPPED | OUTPATIENT
Start: 2021-11-22 | End: 2021-11-27

## 2021-11-22 RX ORDER — DOXYCYCLINE HYCLATE 100 MG/1
100 CAPSULE ORAL 2 TIMES DAILY
Qty: 14 CAPSULE | Refills: 0 | Status: SHIPPED | OUTPATIENT
Start: 2021-11-22 | End: 2021-11-29

## 2021-11-22 NOTE — PROGRESS NOTES
Venipuncture Blood Specimen Collection  Venipuncture performed in left arm  by Mckenna Ulloa with good hemostasis. Patient tolerated the procedure well without complications.     11/22/21   Mckenna Ulloa

## 2021-11-22 NOTE — PROGRESS NOTES
Chief Complaint  Cough (Cough and congestion ) and URI    Subjective          Charlotte Paredes presents to [facility]  Pt is partially vaccinated for covid.    Pt has had left sided abdominal pain x 2 days- sudden onset- worsening symptoms  Pt says no chance of pregnancy    URI   This is a new problem. Episode onset: 4 days. The problem has been gradually worsening. The maximum temperature recorded prior to her arrival was 100.4 - 100.9 F. Associated symptoms include abdominal pain, congestion, coughing and a sore throat. Pertinent negatives include no chest pain, diarrhea, dysuria, ear pain, headaches, joint pain, joint swelling, nausea, neck pain, plugged ear sensation, rash, rhinorrhea, sinus pain, sneezing, swollen glands, vomiting or wheezing. She has tried nothing for the symptoms.   Abdominal Pain  This is a new problem. The current episode started yesterday. The onset quality is sudden. The problem occurs constantly. The most recent episode lasted 2 days. The pain is located in the LLQ and LUQ. The pain is moderate. The quality of the pain is sharp. The abdominal pain radiates to the LLQ. Associated symptoms include a fever. Pertinent negatives include no anorexia, arthralgias, belching, constipation, diarrhea, dysuria, flatus, frequency, headaches, hematochezia, hematuria, melena, myalgias, nausea, vomiting or weight loss. Nothing aggravates the pain. The pain is relieved by nothing. She has tried nothing for the symptoms.       Objective   Allergies   Allergen Reactions   • Cefaclor Hives     Immunization History   Administered Date(s) Administered   • COVID-19 (PFIZER) 10/20/2021   • HPV Quadrivalent 08/15/2008   • Pneumococcal Polysaccharide (PPSV23) 09/07/2017   • Tdap 08/15/2008, 03/11/2014       Vital Signs:   Vitals:    11/22/21 1017   BP: 100/80   Pulse: 100   Temp: 98.1 °F (36.7 °C)   SpO2: 99%       Physical Exam  Vitals reviewed.   Constitutional:       Appearance:  Normal appearance. She is well-developed.   HENT:      Head: Normocephalic and atraumatic.      Right Ear: Tympanic membrane, ear canal and external ear normal.      Left Ear: Tympanic membrane, ear canal and external ear normal.      Nose: Nose normal.      Mouth/Throat:      Mouth: Mucous membranes are moist.      Pharynx: Oropharynx is clear. Posterior oropharyngeal erythema present. No oropharyngeal exudate.   Eyes:      Conjunctiva/sclera: Conjunctivae normal.      Pupils: Pupils are equal, round, and reactive to light.   Cardiovascular:      Rate and Rhythm: Normal rate and regular rhythm.      Pulses: Normal pulses.      Heart sounds: Normal heart sounds. No murmur heard.  No friction rub. No gallop.    Pulmonary:      Effort: Pulmonary effort is normal.      Breath sounds: Normal breath sounds. No wheezing or rhonchi.   Abdominal:      General: Bowel sounds are normal. There is no distension.      Palpations: Abdomen is soft. There is no mass.      Tenderness: There is no abdominal tenderness. There is no right CVA tenderness, left CVA tenderness, guarding or rebound.      Hernia: No hernia is present.   Musculoskeletal:         General: Normal range of motion.   Skin:     General: Skin is warm and dry.      Capillary Refill: Capillary refill takes less than 2 seconds.   Neurological:      General: No focal deficit present.      Mental Status: She is alert and oriented to person, place, and time.      Cranial Nerves: No cranial nerve deficit.   Psychiatric:         Mood and Affect: Mood and affect normal.         Behavior: Behavior normal.         Thought Content: Thought content normal.         Judgment: Judgment normal.        Result Review :                 Assessment and Plan    Diagnoses and all orders for this visit:    1. Coughing (Primary)  -     POCT SARS-CoV-2 Antigen GARDENIA  -     COVID-19,CEPHEID/PAOLA/BDMAX,COR/AALIYAH/PAD/JULES IN-HOUSE(OR EMERGENT/ADD-ON),NP SWAB IN TRANSPORT MEDIA 3-4 HR TAT, RT-PCR -  Swab, Nasopharynx    2. Abdominal pain, acute, generalized  -     POCT urinalysis dipstick, automated    3. Left sided abdominal pain  -     POCT pregnancy, urine  -     CBC Auto Differential  -     Comprehensive Metabolic Panel  -     CT Abdomen Pelvis With Contrast; Future  -     pantoprazole (Protonix) 40 MG EC tablet; Take 1 tablet by mouth Daily.  Dispense: 10 tablet; Refill: 0    4. Acute cystitis without hematuria  -     Urine Culture - Urine, Urine, Clean Catch    5. Pharyngitis, unspecified etiology  -     doxycycline (VIBRAMYCIN) 100 MG capsule; Take 1 capsule by mouth 2 (Two) Times a Day for 7 days.  Dispense: 14 capsule; Refill: 0  -     predniSONE (DELTASONE) 20 MG tablet; Take 2 tablets by mouth Daily for 5 days.  Dispense: 10 tablet; Refill: 0            Follow Up   Return in about 1 week (around 11/29/2021) for Recheck.  Patient was given instructions and counseling regarding her condition or for health maintenance advice. Please see specific information pulled into the AVS if appropriate.   Pt told to go to ER if symptoms or pain worsens or she has any trouble breathing.

## 2021-11-23 ENCOUNTER — TELEPHONE (OUTPATIENT)
Dept: FAMILY MEDICINE CLINIC | Facility: CLINIC | Age: 26
End: 2021-11-23

## 2021-11-23 LAB — BACTERIA SPEC AEROBE CULT: NO GROWTH

## 2021-11-23 NOTE — PROGRESS NOTES
Call pt; covid is negative.  Labs show no significant abnormalities.  Follow-up next week if not better.

## 2021-12-09 ENCOUNTER — OFFICE VISIT (OUTPATIENT)
Dept: FAMILY MEDICINE CLINIC | Facility: CLINIC | Age: 26
End: 2021-12-09

## 2021-12-09 VITALS
OXYGEN SATURATION: 98 % | BODY MASS INDEX: 37.77 KG/M2 | DIASTOLIC BLOOD PRESSURE: 70 MMHG | HEART RATE: 76 BPM | TEMPERATURE: 97.7 F | WEIGHT: 227 LBS | SYSTOLIC BLOOD PRESSURE: 110 MMHG

## 2021-12-09 DIAGNOSIS — R16.1 SPLENOMEGALY: ICD-10-CM

## 2021-12-09 DIAGNOSIS — K46.9 NON-RECURRENT ABDOMINAL HERNIA WITHOUT OBSTRUCTION OR GANGRENE, UNSPECIFIED HERNIA TYPE: ICD-10-CM

## 2021-12-09 DIAGNOSIS — R10.12 LUQ ABDOMINAL PAIN: Primary | ICD-10-CM

## 2021-12-09 PROCEDURE — 99213 OFFICE O/P EST LOW 20 MIN: CPT | Performed by: FAMILY MEDICINE

## 2021-12-09 RX ORDER — IBUPROFEN 800 MG/1
800 TABLET ORAL EVERY 8 HOURS PRN
Qty: 21 TABLET | Refills: 1 | Status: SHIPPED | OUTPATIENT
Start: 2021-12-09 | End: 2022-10-11

## 2021-12-09 NOTE — PROGRESS NOTES
Chief Complaint  Abdominal Pain (Follow-up on enlarged spleen. Went to ER last night.. 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   Constitutional:       Appearance: Normal appearance. She is well-developed.   HENT:      Head: Normocephalic and atraumatic.      Right Ear: External ear normal.      Left Ear: External ear normal.      Mouth/Throat:      Pharynx: No oropharyngeal exudate.   Eyes:      Conjunctiva/sclera: Conjunctivae normal.      Pupils: Pupils are equal, round, and reactive to light.   Cardiovascular:      Rate and Rhythm: Normal rate and regular rhythm.      Pulses: Normal pulses.      Heart sounds: Normal heart sounds. No murmur heard.  No friction rub. No gallop.    Pulmonary:      Effort: Pulmonary effort is normal.      Breath sounds: Normal breath sounds. No wheezing or rhonchi.   Abdominal:      General: Abdomen is flat. Bowel sounds are normal. There is no distension.      Palpations: Abdomen is soft. There is no mass.      Tenderness: There is abdominal tenderness. There is no guarding or rebound.      Hernia: A hernia is present.      Comments: LUQ abdominal tenderness, 4-5cm reducible LUQ hernia.   Musculoskeletal:          General: Normal range of motion.   Skin:     General: Skin is warm and dry.      Capillary Refill: Capillary refill takes less than 2 seconds.   Neurological:      General: No focal deficit present.      Mental Status: She is alert and oriented to person, place, and time.      Cranial Nerves: No cranial nerve deficit.   Psychiatric:         Mood and Affect: Mood and affect normal.         Behavior: Behavior normal.         Thought Content: Thought content normal.         Judgment: Judgment normal.        Result Review :                 Assessment and Plan    Diagnoses and all orders for this visit:    1. LUQ abdominal pain (Primary)  -     Cancel: Ambulatory Referral to General Surgery  -     Ambulatory Referral to General Surgery  -     ibuprofen (ADVIL,MOTRIN) 800 MG tablet; Take 1 tablet by mouth Every 8 (Eight) Hours As Needed for Mild Pain .  Dispense: 21 tablet; Refill: 1    2. Splenomegaly  -     Cancel: Ambulatory Referral to General Surgery  -     Ambulatory Referral to General Surgery    3. Non-recurrent abdominal hernia without obstruction or gangrene, unspecified hernia type  -     Ambulatory Referral to General Surgery            Follow Up   Return if symptoms worsen or fail to improve.  Patient was given instructions and counseling regarding her condition or for health maintenance advice. Please see specific information pulled into the AVS if appropriate.   If ibuprofen does not work will send out ultram until sees surgery.

## 2021-12-18 PROBLEM — K46.9 NON-RECURRENT ABDOMINAL HERNIA WITHOUT OBSTRUCTION OR GANGRENE: Status: ACTIVE | Noted: 2021-12-18

## 2021-12-18 PROBLEM — R16.1 SPLENOMEGALY: Status: ACTIVE | Noted: 2021-12-18

## 2022-01-04 NOTE — PRE-PROCEDURE INSTRUCTIONS
PATIENT INSTRUCTED TO BE:    - NPO AFTER MIDNIGHT EXCEPT CAN HAVE CLEAR LIQUIDS 2 HOURS PRIOR TO SURGERY ARRIVAL TIME     - TO HOLD ALL VITAMINS, SUPPLEMENTS, NSAIDS FOR ONE WEEK PRIOR TO THEIR SURGICAL PROCEDURE    - INSTRUCTED NO LOTIONS, JEWELRY, PIERCINGS, OR DEODORANT DAY OF SURGERY    - IF DIABETIC, CHECK BLOOD GLUCOSE IF LESS THAN 70 CALL PREOP AREA -AM OF SURGERY     - INSTRUCTED PATIENT TO FOLLOW THE SURGEON'S INSTRUCTIONS GIVEN TO HER.    - INSTRUCTED TO TAKE THE FOLLOWING MEDICATIONS THE DAY OF SURGERY:        ALLEGRA, PROTONIX    PATIENT VERBALIZED UNDERSTANDING

## 2022-01-05 ENCOUNTER — CLINICAL SUPPORT (OUTPATIENT)
Dept: FAMILY MEDICINE CLINIC | Facility: CLINIC | Age: 27
End: 2022-01-05

## 2022-01-05 DIAGNOSIS — Z23 NEED FOR VACCINATION: Primary | ICD-10-CM

## 2022-01-05 PROCEDURE — 0001A COVID-19 (PFIZER): CPT | Performed by: FAMILY MEDICINE

## 2022-01-05 PROCEDURE — 91300 COVID-19 (PFIZER): CPT | Performed by: FAMILY MEDICINE

## 2022-01-05 PROCEDURE — 90471 IMMUNIZATION ADMIN: CPT | Performed by: FAMILY MEDICINE

## 2022-01-07 ENCOUNTER — HOSPITAL ENCOUNTER (OUTPATIENT)
Facility: HOSPITAL | Age: 27
Setting detail: HOSPITAL OUTPATIENT SURGERY
Discharge: HOME | End: 2022-01-07
Attending: INTERNAL MEDICINE | Admitting: INTERNAL MEDICINE

## 2022-01-07 ENCOUNTER — ANESTHESIA (OUTPATIENT)
Dept: GASTROENTEROLOGY | Facility: HOSPITAL | Age: 27
End: 2022-01-07

## 2022-01-07 ENCOUNTER — ANESTHESIA EVENT (OUTPATIENT)
Dept: GASTROENTEROLOGY | Facility: HOSPITAL | Age: 27
End: 2022-01-07

## 2022-01-07 VITALS
BODY MASS INDEX: 37.47 KG/M2 | HEART RATE: 79 BPM | SYSTOLIC BLOOD PRESSURE: 117 MMHG | HEIGHT: 65 IN | TEMPERATURE: 97.5 F | RESPIRATION RATE: 20 BRPM | DIASTOLIC BLOOD PRESSURE: 69 MMHG | WEIGHT: 224.87 LBS | OXYGEN SATURATION: 98 %

## 2022-01-07 DIAGNOSIS — R10.12 LEFT UPPER QUADRANT ABDOMINAL PAIN: ICD-10-CM

## 2022-01-07 DIAGNOSIS — R12 HEARTBURN: ICD-10-CM

## 2022-01-07 LAB — B-HCG UR QL: NEGATIVE

## 2022-01-07 PROCEDURE — 43239 EGD BIOPSY SINGLE/MULTIPLE: CPT | Performed by: INTERNAL MEDICINE

## 2022-01-07 PROCEDURE — 25010000002 PROPOFOL 10 MG/ML EMULSION: Performed by: NURSE ANESTHETIST, CERTIFIED REGISTERED

## 2022-01-07 PROCEDURE — 81025 URINE PREGNANCY TEST: CPT | Performed by: ANESTHESIOLOGY

## 2022-01-07 PROCEDURE — 88305 TISSUE EXAM BY PATHOLOGIST: CPT | Performed by: INTERNAL MEDICINE

## 2022-01-07 RX ORDER — SODIUM CHLORIDE, SODIUM LACTATE, POTASSIUM CHLORIDE, CALCIUM CHLORIDE 600; 310; 30; 20 MG/100ML; MG/100ML; MG/100ML; MG/100ML
30 INJECTION, SOLUTION INTRAVENOUS CONTINUOUS
Status: DISCONTINUED | OUTPATIENT
Start: 2022-01-07 | End: 2022-01-07 | Stop reason: HOSPADM

## 2022-01-07 RX ORDER — LIDOCAINE HYDROCHLORIDE 20 MG/ML
INJECTION, SOLUTION INFILTRATION; PERINEURAL AS NEEDED
Status: DISCONTINUED | OUTPATIENT
Start: 2022-01-07 | End: 2022-01-07 | Stop reason: SURG

## 2022-01-07 RX ORDER — PROPOFOL 10 MG/ML
VIAL (ML) INTRAVENOUS AS NEEDED
Status: DISCONTINUED | OUTPATIENT
Start: 2022-01-07 | End: 2022-01-07 | Stop reason: SURG

## 2022-01-07 RX ADMIN — PROPOFOL 100 MG: 10 INJECTION, EMULSION INTRAVENOUS at 08:44

## 2022-01-07 RX ADMIN — PROPOFOL 200 MCG/KG/MIN: 10 INJECTION, EMULSION INTRAVENOUS at 08:44

## 2022-01-07 RX ADMIN — LIDOCAINE HYDROCHLORIDE 80 MG: 20 INJECTION, SOLUTION INFILTRATION; PERINEURAL at 08:44

## 2022-01-07 RX ADMIN — PROPOFOL 50 MG: 10 INJECTION, EMULSION INTRAVENOUS at 08:45

## 2022-01-07 RX ADMIN — SODIUM CHLORIDE, POTASSIUM CHLORIDE, SODIUM LACTATE AND CALCIUM CHLORIDE: 600; 310; 30; 20 INJECTION, SOLUTION INTRAVENOUS at 08:44

## 2022-01-07 NOTE — ANESTHESIA POSTPROCEDURE EVALUATION
Patient: Charlotte Paredes    Procedure Summary     Date: 01/07/22 Room / Location: [facility] ENDOSCOPY 1 / [facility] ENDOSCOPY    Anesthesia Start: 0841 Anesthesia Stop: 0858    Procedure: ESOPHAGOGASTRODUODENOSCOPY wtih biopsies (N/A ) Diagnosis:       Heartburn      Left upper quadrant abdominal pain      (Heartburn [R12])      (Left upper quadrant abdominal pain [R10.12])    Surgeons: Sirisha Thomson MD Provider: Brandin Quintanilla MD    Anesthesia Type: general ASA Status: 2          Anesthesia Type: general    Vitals  Vitals Value Taken Time   /69 01/07/22 0905   Temp 36.4 °C (97.5 °F) 01/07/22 0905   Pulse 79 01/07/22 0908   Resp 20 01/07/22 0905   SpO2 97 % 01/07/22 0908   Vitals shown include unvalidated device data.        Post Anesthesia Care and Evaluation    Patient location during evaluation: bedside  Patient participation: complete - patient participated  Level of consciousness: awake  Pain management: adequate  Airway patency: patent  Anesthetic complications: No anesthetic complications  PONV Status: none  Cardiovascular status: acceptable and stable  Respiratory status: acceptable  Hydration status: acceptable    Comments: An Anesthesiologist personally participated in the most demanding procedures (including induction and emergence if applicable) in the anesthesia plan, monitored the course of anesthesia administration at frequent intervals and remained physically present and available for immediate diagnosis and treatment of emergencies.

## 2022-01-07 NOTE — H&P
Pre Procedure History & Physical    Chief Complaint:   GERD, LUQ pain    Subjective     HPI:   27 yo F here for eval of GERD, LUQ pain.    Past Medical History:   Past Medical History:   Diagnosis Date   • Anxiety    • Asthma     inhalers prn   • Depression    • GERD (gastroesophageal reflux disease)    • Migraines    • Neurocardiogenic syncope     no episodes in 2yrs, only sees pcp   • Recurrent tonsillitis    • Seasonal allergies        Past Surgical History:  Past Surgical History:   Procedure Laterality Date   • ABDOMINAL SURGERY  2012    ex lap   •  SECTION      x3       Family History:  Family History   Problem Relation Age of Onset   • Anemia Mother    • COPD Mother    • Depression Mother    • Alcohol abuse Mother    • Alcohol abuse Father    • Depression Brother    • Alcohol abuse Brother    • Arthritis Brother    • Diabetes type II Maternal Grandmother    • Hypertension Maternal Grandmother    • Breast cancer Maternal Grandmother    • COPD Maternal Grandfather    • Alcohol abuse Maternal Grandfather    • Liver cancer Maternal Grandfather    • Hyperlipidemia Paternal Grandfather    • Hypertension Paternal Grandfather        Social History:   reports that she has never smoked. She has never used smokeless tobacco. She reports current alcohol use. She reports that she does not use drugs.    Medications:   Medications Prior to Admission   Medication Sig Dispense Refill Last Dose   • fexofenadine (Allegra Allergy) 180 MG tablet Take 180 mg by mouth Daily.   2022 at Unknown time   • ibuprofen (ADVIL,MOTRIN) 800 MG tablet Take 1 tablet by mouth Every 8 (Eight) Hours As Needed for Mild Pain . 21 tablet 1 2022 at Unknown time   • pantoprazole (Protonix) 40 MG EC tablet Take 1 tablet by mouth Daily. 10 tablet 0 2022 at Unknown time   • levonorgestrel (MIRENA) 20 MCG/24HR IUD 1 each by Intrauterine route.   Unknown at Unknown time       Allergies:  Cefaclor    ROS:    Pertinent items are noted in  "HPI     Objective     Height 165.1 cm (65\"), weight 102 kg (224 lb 13.9 oz).    Physical Exam   Constitutional: Pt is oriented to person, place, and time and well-developed, well-nourished, and in no distress.   Mouth/Throat: Oropharynx is clear and moist.   Neck: Normal range of motion.   Cardiovascular: Normal rate, regular rhythm and normal heart sounds.    Pulmonary/Chest: Effort normal and breath sounds normal.   Abdominal: Soft. Nontender  Skin: Skin is warm and dry.   Psychiatric: Mood, memory, affect and judgment normal.     Assessment/Plan     Diagnosis:  GERD, LUQ pain    Anticipated Surgical Procedure:  EGD    The risks, benefits, and alternatives of this procedure have been discussed with the patient or the responsible party- the patient understands and agrees to proceed.          "

## 2022-01-07 NOTE — ANESTHESIA PREPROCEDURE EVALUATION
Anesthesia Evaluation     Patient summary reviewed and Nursing notes reviewed   no history of anesthetic complications:  NPO Solid Status: > 8 hours  NPO Liquid Status: > 2 hours           Airway   Mallampati: II  TM distance: >3 FB  Neck ROM: full  No difficulty expected  Dental      Pulmonary - normal exam    breath sounds clear to auscultation  (+) asthma,  Cardiovascular - negative cardio ROS and normal exam  Exercise tolerance: good (4-7 METS)    Rhythm: regular        Neuro/Psych  (+) headaches,     GI/Hepatic/Renal/Endo    (+) obesity,  GERD,      Musculoskeletal (-) negative ROS    Abdominal    Substance History - negative use     OB/GYN negative ob/gyn ROS         Other - negative ROS                       Anesthesia Plan    ASA 2     general   total IV anesthesia    Anesthetic plan, all risks, benefits, and alternatives have been provided, discussed and informed consent has been obtained with: patient.

## 2022-01-10 LAB
CYTO UR: NORMAL
LAB AP CASE REPORT: NORMAL
LAB AP CLINICAL INFORMATION: NORMAL
PATH REPORT.FINAL DX SPEC: NORMAL
PATH REPORT.GROSS SPEC: NORMAL

## 2022-01-11 ENCOUNTER — TELEPHONE (OUTPATIENT)
Dept: FAMILY MEDICINE CLINIC | Facility: CLINIC | Age: 27
End: 2022-01-11

## 2022-01-11 NOTE — TELEPHONE ENCOUNTER
Caller: Charlotte Paredes    Relationship: Self    Best call back number: [phone]    What is the medical concern/diagnosis: HERNIA     What specialty or service is being requested: GENERAL SURGERY     What is the provider, practice or medical service name: Chilango Brown M.D.    What is the office location: [address] [place] [address] [address] [address]    What is the office phone number: [phone]    FAX: [phone]     Any additional details: PATIENT STATES IS NEEDING ANOTHER REFERRAL SENT OVER, LAST REFERRAL  FOR THE YEAR.

## 2022-01-17 ENCOUNTER — TELEPHONE (OUTPATIENT)
Dept: GASTROENTEROLOGY | Facility: CLINIC | Age: 27
End: 2022-01-17

## 2022-01-17 NOTE — TELEPHONE ENCOUNTER
----- Message from OCTAVIO Suarez sent at 1/16/2022 10:25 AM EST -----  Biopsy consistent with gastritis, reflux esophagitis, and Barretts esophagus. Continue PPI and avoid NSAIDS. Please place patient in egd recall for one year due to new dx of barretts. Please make sure pt has f/u scheduled.

## 2022-01-17 NOTE — TELEPHONE ENCOUNTER
Spoke to pt and informed of Juan C CUNNINGHAM note. Educated pt on Obrien's Esophagus. Confirmed pt is taking a PPI. F/U scheduled on 03/24/2022 @ 1500 with Juan C CUNNINGHAM. Verified understanding. Apt reminder and pt education on Obrien's Esophagus from [employer] mailed to pt.   1 year EGD recall placed.

## 2022-01-31 ENCOUNTER — OFFICE VISIT (OUTPATIENT)
Dept: FAMILY MEDICINE CLINIC | Facility: CLINIC | Age: 27
End: 2022-01-31

## 2022-01-31 VITALS
OXYGEN SATURATION: 97 % | SYSTOLIC BLOOD PRESSURE: 130 MMHG | WEIGHT: 225.6 LBS | HEART RATE: 129 BPM | DIASTOLIC BLOOD PRESSURE: 80 MMHG | TEMPERATURE: 97.3 F | BODY MASS INDEX: 37.54 KG/M2

## 2022-01-31 DIAGNOSIS — H66.92 LEFT OTITIS MEDIA, UNSPECIFIED OTITIS MEDIA TYPE: Primary | ICD-10-CM

## 2022-01-31 DIAGNOSIS — H60.392 OTHER INFECTIVE ACUTE OTITIS EXTERNA OF LEFT EAR: ICD-10-CM

## 2022-01-31 PROCEDURE — 99213 OFFICE O/P EST LOW 20 MIN: CPT | Performed by: FAMILY MEDICINE

## 2022-01-31 RX ORDER — FLUTICASONE PROPIONATE 50 MCG
1-2 SPRAY, SUSPENSION (ML) NASAL DAILY
COMMUNITY
Start: 2021-11-12

## 2022-01-31 RX ORDER — TOBRAMYCIN AND DEXAMETHASONE 3; 1 MG/ML; MG/ML
1 SUSPENSION/ DROPS OPHTHALMIC 2 TIMES DAILY
Start: 2022-01-31 | End: 2022-02-05

## 2022-01-31 RX ORDER — AMOXICILLIN 500 MG/1
500 CAPSULE ORAL 2 TIMES DAILY
Qty: 20 CAPSULE | Refills: 0 | Status: SHIPPED | OUTPATIENT
Start: 2022-01-31 | End: 2022-02-10

## 2022-01-31 NOTE — PROGRESS NOTES
Chief Complaint    Ear Drainage (left ear pain goes down in throat)    Subjective      Charlotte Paredes presents to [facility]    History of Present Illness    1.) EAR PAIN : Onset - this AM. Left ear. Radiation to throat. (+) Drainage. No fevers or chills. No URI sxs.     Objective     Vital Signs:     /80   Pulse (!) 129   Temp 97.3 °F (36.3 °C)   Wt 102 kg (225 lb 9.6 oz)   SpO2 97%   BMI 37.54 kg/m²       Physical Exam  Vitals reviewed.   Constitutional:       General: She is not in acute distress.     Appearance: Normal appearance. She is well-developed.   HENT:      Head: Normocephalic and atraumatic.      Right Ear: Hearing and external ear normal. Tympanic membrane is not injected or erythematous.      Left Ear: Hearing and external ear normal. Tympanic membrane is injected and erythematous.      Ears:      Comments: Erythema of external canal     Nose: Nose normal.   Eyes:      General: Lids are normal.         Right eye: No discharge.         Left eye: No discharge.      Conjunctiva/sclera: Conjunctivae normal.   Pulmonary:      Effort: Pulmonary effort is normal.   Abdominal:      General: There is no distension.   Musculoskeletal:         General: No swelling.      Cervical back: Neck supple.   Skin:     Coloration: Skin is not jaundiced.      Findings: No erythema.   Neurological:      Mental Status: She is alert. Mental status is at baseline.   Psychiatric:         Mood and Affect: Mood and affect normal.         Thought Content: Thought content normal.     Assessment and Plan     Diagnoses and all orders for this visit:    1. Left otitis media, unspecified otitis media type (Primary)  Comments:  1.) Treatment as noted below.    2. Other infective acute otitis externa of left ear  Comments:  1.) See below.    Other orders  -     amoxicillin (AMOXIL) 500 MG capsule; Take 1 capsule by mouth 2 (Two) Times a Day for 10 days.  Dispense: 20 capsule; Refill: 0  -      tobramycin-dexamethasone (TOBRADEX) 0.3-0.1 %; Administer 1 drop into the left ear 2 (Two) Times a Day for 5 days.    Follow Up     Return if symptoms worsen or fail to improve.    Patient was given instructions and counseling regarding her condition or for health maintenance advice. Please see specific information pulled into the AVS if appropriate.

## 2022-02-07 ENCOUNTER — TELEPHONE (OUTPATIENT)
Dept: FAMILY MEDICINE CLINIC | Facility: CLINIC | Age: 27
End: 2022-02-07

## 2022-02-07 DIAGNOSIS — B37.31 VAGINAL CANDIDIASIS: Primary | ICD-10-CM

## 2022-02-07 RX ORDER — FLUCONAZOLE 150 MG/1
150 TABLET ORAL DAILY
Qty: 2 TABLET | Refills: 0 | Status: SHIPPED | OUTPATIENT
Start: 2022-02-07 | End: 2022-02-09

## 2022-02-07 NOTE — TELEPHONE ENCOUNTER
Caller: JIGAR LEAL    Relationship to patient: Mother    Best call back number: [phone]    Patient is needing: PATIENTS MOTHER CALLED STATING SHE IS WANTING TO KNOW IF THE PATIENTS PCP CAN CALL HER IN SOMETHING FOR A YEAST INFECTION. SHE STATED SHE IS HAVING VAGINAL ITCHING AND THAT EVERY TIME SHE TAKES ANTIBIOTICS SHE ALWAYS GETS A YEAST INFECTION. PLEASE ADVISE THE PATIENT THANK YOU.          [facility]OUn[facility] [address] [place], [place] - [address] [place] [address] - [phone] [facility] [phone]   [phone]

## 2022-02-17 ENCOUNTER — OFFICE VISIT (OUTPATIENT)
Dept: FAMILY MEDICINE CLINIC | Facility: CLINIC | Age: 27
End: 2022-02-17

## 2022-02-17 VITALS
BODY MASS INDEX: 37.99 KG/M2 | WEIGHT: 228 LBS | SYSTOLIC BLOOD PRESSURE: 124 MMHG | DIASTOLIC BLOOD PRESSURE: 74 MMHG | HEIGHT: 65 IN | HEART RATE: 89 BPM | TEMPERATURE: 97.7 F | OXYGEN SATURATION: 97 %

## 2022-02-17 DIAGNOSIS — J02.9 PHARYNGITIS, UNSPECIFIED ETIOLOGY: ICD-10-CM

## 2022-02-17 DIAGNOSIS — B37.31 VAGINAL CANDIDIASIS: ICD-10-CM

## 2022-02-17 DIAGNOSIS — H66.92 LEFT OTITIS MEDIA, UNSPECIFIED OTITIS MEDIA TYPE: Primary | ICD-10-CM

## 2022-02-17 PROBLEM — K22.70 BARRETT'S ESOPHAGUS: Status: ACTIVE | Noted: 2022-02-01

## 2022-02-17 PROCEDURE — 99213 OFFICE O/P EST LOW 20 MIN: CPT | Performed by: FAMILY MEDICINE

## 2022-02-17 RX ORDER — AZITHROMYCIN 250 MG/1
TABLET, FILM COATED ORAL
Qty: 6 TABLET | Refills: 0 | Status: SHIPPED | OUTPATIENT
Start: 2022-02-17 | End: 2022-04-19

## 2022-02-17 RX ORDER — FLUCONAZOLE 150 MG/1
150 TABLET ORAL DAILY
Qty: 2 TABLET | Refills: 0 | Status: SHIPPED | OUTPATIENT
Start: 2022-02-17 | End: 2022-04-19 | Stop reason: SDUPTHER

## 2022-02-17 RX ORDER — CIPROFLOXACIN AND DEXAMETHASONE 3; 1 MG/ML; MG/ML
4 SUSPENSION/ DROPS AURICULAR (OTIC) 2 TIMES DAILY
Qty: 7.5 ML | Refills: 0 | Status: SHIPPED | OUTPATIENT
Start: 2022-02-17 | End: 2022-04-19

## 2022-02-17 NOTE — PROGRESS NOTES
Chief Complaint  Ear Problem (Left ear hurting, started yesterday morning)    Subjective          Charlotte Paredes presents to [facility]  Pt had recent negative covid test.    Earache   There is pain in the left ear. This is a new problem. Episode onset: 3-4 days. The problem occurs constantly. The problem has been unchanged. There has been no fever. The pain is moderate. Associated symptoms include coughing and ear discharge. Pertinent negatives include no abdominal pain, diarrhea, headaches, hearing loss, neck pain, rash, rhinorrhea, sore throat or vomiting. She has tried ear drops for the symptoms. The treatment provided mild relief.       Objective   Allergies   Allergen Reactions   • Cefaclor Hives     Immunization History   Administered Date(s) Administered   • COVID-19 (PFIZER) PURPLE CAP 10/20/2021, 2022   • HPV Quadrivalent 08/15/2008   • Pneumococcal Polysaccharide (PPSV23) 2017   • Tdap 08/15/2008, 2014     Past Medical History:   Diagnosis Date   • Anxiety    • Asthma     inhalers prn   • Depression    • GERD (gastroesophageal reflux disease)    • Migraines    • Neurocardiogenic syncope     no episodes in 2yrs, only sees pcp   • Recurrent tonsillitis    • Seasonal allergies       Past Surgical History:   Procedure Laterality Date   • ABDOMINAL SURGERY      ex lap   •  SECTION      x3   • ENDOSCOPY N/A 2022    Procedure: ESOPHAGOGASTRODUODENOSCOPY wtih biopsies;  Surgeon: Sirisha Thomson MD;  Location: [facility] ENDOSCOPY;  Service: Gastroenterology;  Laterality: N/A;  esophagitis and gastritis      Social History     Socioeconomic History   • Marital status: Single   Tobacco Use   • Smoking status: Never Smoker   • Smokeless tobacco: Never Used   Vaping Use   • Vaping Use: Never used   Substance and Sexual Activity   • Alcohol use: Yes     Comment: rarely   • Drug use: Never   • Sexual activity: Defer        Current Outpatient  "Medications:   •  fexofenadine (Allegra Allergy) 180 MG tablet, Take 180 mg by mouth Daily., Disp: , Rfl:   •  fluticasone (FLONASE) 50 MCG/ACT nasal spray, 1-2 sprays by Each Nare route Daily., Disp: , Rfl:   •  ibuprofen (ADVIL,MOTRIN) 800 MG tablet, Take 1 tablet by mouth Every 8 (Eight) Hours As Needed for Mild Pain ., Disp: 21 tablet, Rfl: 1  •  levonorgestrel (MIRENA) 20 MCG/24HR IUD, 1 each by Intrauterine route., Disp: , Rfl:   •  pantoprazole (Protonix) 40 MG EC tablet, Take 1 tablet by mouth Daily., Disp: 10 tablet, Rfl: 0  •  azithromycin (Zithromax Z-Jamar) 250 MG tablet, Take 2 tablets by mouth on day 1, then 1 tablet daily on days 2-5, Disp: 6 tablet, Rfl: 0  •  ciprofloxacin-dexamethasone (Ciprodex) 0.3-0.1 % otic suspension, Administer 4 drops into the left ear 2 (Two) Times a Day., Disp: 7.5 mL, Rfl: 0  •  fluconazole (Diflucan) 150 MG tablet, Take 1 tablet by mouth Daily., Disp: 2 tablet, Rfl: 0   Family History   Problem Relation Age of Onset   • Anemia Mother    • COPD Mother    • Depression Mother    • Alcohol abuse Mother    • Alcohol abuse Father    • Depression Brother    • Alcohol abuse Brother    • Arthritis Brother    • Diabetes type II Maternal Grandmother    • Hypertension Maternal Grandmother    • Breast cancer Maternal Grandmother    • COPD Maternal Grandfather    • Alcohol abuse Maternal Grandfather    • Liver cancer Maternal Grandfather    • Hyperlipidemia Paternal Grandfather    • Hypertension Paternal Grandfather           Vital Signs:   Vitals:    02/17/22 1012   BP: 124/74   BP Location: Left arm   Patient Position: Sitting   Cuff Size: Adult   Pulse: 89   Temp: 97.7 °F (36.5 °C)   TempSrc: Temporal   SpO2: 97%   Weight: 103 kg (228 lb)   Height: 165.1 cm (65\")       Review of Systems   HENT: Positive for ear discharge and ear pain. Negative for hearing loss, rhinorrhea and sore throat.    Respiratory: Positive for cough.    Gastrointestinal: Negative for abdominal pain, diarrhea " and vomiting.   Musculoskeletal: Negative for neck pain.   Skin: Negative for rash.   Neurological: Negative for headaches.      Physical Exam  Vitals reviewed.   Constitutional:       Appearance: Normal appearance. She is well-developed.   HENT:      Head: Normocephalic and atraumatic.      Right Ear: Tympanic membrane, ear canal and external ear normal.      Left Ear: Ear canal and external ear normal.      Ears:      Comments: Left TM cloudy, red.     Mouth/Throat:      Mouth: Mucous membranes are moist.      Pharynx: Oropharynx is clear. Posterior oropharyngeal erythema present. No oropharyngeal exudate.   Eyes:      Conjunctiva/sclera: Conjunctivae normal.      Pupils: Pupils are equal, round, and reactive to light.   Cardiovascular:      Rate and Rhythm: Normal rate and regular rhythm.      Pulses: Normal pulses.      Heart sounds: Normal heart sounds. No murmur heard.  No friction rub. No gallop.    Pulmonary:      Effort: Pulmonary effort is normal.      Breath sounds: Normal breath sounds. No wheezing or rhonchi.   Abdominal:      General: Bowel sounds are normal. There is no distension.      Palpations: Abdomen is soft.      Tenderness: There is no abdominal tenderness.   Musculoskeletal:         General: Normal range of motion.      Cervical back: Normal range of motion and neck supple.   Skin:     General: Skin is warm and dry.      Capillary Refill: Capillary refill takes less than 2 seconds.   Neurological:      General: No focal deficit present.      Mental Status: She is alert and oriented to person, place, and time.      Cranial Nerves: No cranial nerve deficit.   Psychiatric:         Mood and Affect: Mood and affect normal.         Behavior: Behavior normal.         Thought Content: Thought content normal.         Judgment: Judgment normal.        Result Review :                 Assessment and Plan    Diagnoses and all orders for this visit:    1. Left otitis media, unspecified otitis media type  (Primary)  -     azithromycin (Zithromax Z-Jamar) 250 MG tablet; Take 2 tablets by mouth on day 1, then 1 tablet daily on days 2-5  Dispense: 6 tablet; Refill: 0  -     ciprofloxacin-dexamethasone (Ciprodex) 0.3-0.1 % otic suspension; Administer 4 drops into the left ear 2 (Two) Times a Day.  Dispense: 7.5 mL; Refill: 0    2. Pharyngitis, unspecified etiology  -     azithromycin (Zithromax Z-Jamar) 250 MG tablet; Take 2 tablets by mouth on day 1, then 1 tablet daily on days 2-5  Dispense: 6 tablet; Refill: 0    3. Vaginal candidiasis  -     fluconazole (Diflucan) 150 MG tablet; Take 1 tablet by mouth Daily.  Dispense: 2 tablet; Refill: 0            Follow Up   Return in about 1 week (around 2/24/2022), or if symptoms worsen or fail to improve.  Patient was given instructions and counseling regarding her condition or for health maintenance advice. Please see specific information pulled into the AVS if appropriate.

## 2022-03-24 ENCOUNTER — OFFICE VISIT (OUTPATIENT)
Dept: GASTROENTEROLOGY | Facility: CLINIC | Age: 27
End: 2022-03-24

## 2022-03-24 VITALS
DIASTOLIC BLOOD PRESSURE: 83 MMHG | HEIGHT: 65 IN | HEART RATE: 94 BPM | SYSTOLIC BLOOD PRESSURE: 119 MMHG | BODY MASS INDEX: 39.22 KG/M2 | WEIGHT: 235.4 LBS

## 2022-03-24 DIAGNOSIS — K22.70 BARRETT'S ESOPHAGUS WITHOUT DYSPLASIA: Primary | ICD-10-CM

## 2022-03-24 DIAGNOSIS — R10.9 LEFT SIDED ABDOMINAL PAIN: ICD-10-CM

## 2022-03-24 DIAGNOSIS — K29.50 CHRONIC GASTRITIS WITHOUT BLEEDING, UNSPECIFIED GASTRITIS TYPE: ICD-10-CM

## 2022-03-24 DIAGNOSIS — R14.0 ABDOMINAL BLOATING: ICD-10-CM

## 2022-03-24 DIAGNOSIS — R10.12 LEFT UPPER QUADRANT ABDOMINAL PAIN: ICD-10-CM

## 2022-03-24 PROCEDURE — 99214 OFFICE O/P EST MOD 30 MIN: CPT | Performed by: NURSE PRACTITIONER

## 2022-03-24 RX ORDER — PANTOPRAZOLE SODIUM 40 MG/1
40 TABLET, DELAYED RELEASE ORAL DAILY
Qty: 30 TABLET | Refills: 5 | Status: SHIPPED | OUTPATIENT
Start: 2022-03-24 | End: 2022-09-26

## 2022-03-24 RX ORDER — AMITRIPTYLINE HYDROCHLORIDE 10 MG/1
10 TABLET, FILM COATED ORAL NIGHTLY
Qty: 30 TABLET | Refills: 5 | Status: SHIPPED | OUTPATIENT
Start: 2022-03-24 | End: 2023-01-17

## 2022-03-24 NOTE — PROGRESS NOTES
Chief Complaint  EGD (Pt states she went to a general surgeon recently and they put acid sensors in it was done 2022 [facility])    History of Present Illness  Charlotte Paredes is a 27 y.o. who presents to [facility] for follow up of EGD (Pt states she went to a general surgeon recently and they put acid sensors in it was done 2022 [facility])     MS. Paredes presents today for follow-up EGD.  Biopsies were consistent with Obrien's esophagitis and gastritis.  Patient reports she continues to have left upper quadrant pain that radiates to her back.  Describes the pain to be an aching sensation and twisting and turning can make the pain worse.  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  Admits to having heartburn and is mainly controlled with PPI however she does have breakthrough epigastric burning a few times a week.  Drinking 1 cup of coffee daily.  Ibuprofen does not help the LUQ pain.  Abdominal bloating present, worse first thing in the morning.  Denies any nausea, vomiting, or dysphagia.  Appetite and weight stable.    Since EGD patient has also been evaluated with Dr. Yadav at [facility].  Another EGD was performed with [facility].  Acid reflux was noted however did not seem to be any concerning findings.  Gastric emptying study was then performed which was unremarkable.  Patient expressed that she is at a loss on where to go from here.     Labs Result Review Imaging    Past Medical History:   Diagnosis Date   • Anxiety    • Asthma     inhalers prn   • Depression    • GERD (gastroesophageal reflux disease)    • Migraines    • Neurocardiogenic syncope     no episodes in 2yrs, only sees pcp   • Recurrent tonsillitis    • Seasonal allergies        Past Surgical History:   Procedure Laterality Date   • ABDOMINAL SURGERY      ex lap   •  SECTION      x3   • ENDOSCOPY N/A 2022    Procedure:  "ESOPHAGOGASTRODUODENOSCOPY wt biopsies;  Surgeon: Sirisha Thomson MD;  Location: [facility] ENDOSCOPY;  Service: Gastroenterology;  Laterality: N/A;  esophagitis and gastritis       Current Outpatient Medications on File Prior to Visit   Medication Sig Dispense Refill   • fexofenadine (ALLEGRA) 180 MG tablet Take 180 mg by mouth Daily.     • fluconazole (Diflucan) 150 MG tablet Take 1 tablet by mouth Daily. 2 tablet 0   • fluticasone (FLONASE) 50 MCG/ACT nasal spray 1-2 sprays by Each Nare route Daily.     • ibuprofen (ADVIL,MOTRIN) 800 MG tablet Take 1 tablet by mouth Every 8 (Eight) Hours As Needed for Mild Pain . 21 tablet 1   • levonorgestrel (MIRENA) 20 MCG/24HR IUD 1 each by Intrauterine route.     • [DISCONTINUED] pantoprazole (Protonix) 40 MG EC tablet Take 1 tablet by mouth Daily. 10 tablet 0   • azithromycin (Zithromax Z-Jamar) 250 MG tablet Take 2 tablets by mouth on day 1, then 1 tablet daily on days 2-5 6 tablet 0   • ciprofloxacin-dexamethasone (Ciprodex) 0.3-0.1 % otic suspension Administer 4 drops into the left ear 2 (Two) Times a Day. 7.5 mL 0     No current facility-administered medications on file prior to visit.       Social History     Social History Narrative   • Not on file         Objective     Review of Systems   Gastrointestinal: Positive for abdominal distention, abdominal pain and GERD.        Vital Signs:   /83   Pulse 94   Ht 165.1 cm (65\")   Wt 107 kg (235 lb 6.4 oz)   BMI 39.17 kg/m²       Physical Exam  Constitutional:       General: She is not in acute distress.     Appearance: Normal appearance. She is well-developed and normal weight.   HENT:      Head: Normocephalic and atraumatic.   Eyes:      Conjunctiva/sclera: Conjunctivae normal.      Pupils: Pupils are equal, round, and reactive to light.      Visual Fields: Right eye visual fields normal and left eye visual fields normal.   Cardiovascular:      Rate and Rhythm: Normal rate and regular rhythm.      Heart " sounds: Normal heart sounds.   Pulmonary:      Effort: Pulmonary effort is normal. No retractions.      Breath sounds: Normal breath sounds and air entry.   Abdominal:      General: Bowel sounds are normal. There is no distension.      Palpations: Abdomen is soft.      Tenderness: There is abdominal tenderness in the left upper quadrant.      Comments: No appreciable hepatosplenomegaly or ascites   Musculoskeletal:         General: Normal range of motion.      Cervical back: Normal range of motion and neck supple.   Skin:     General: Skin is warm and dry.   Neurological:      Mental Status: She is alert and oriented to person, place, and time.   Psychiatric:         Mood and Affect: Mood and affect normal.         Behavior: Behavior normal.         Result Review :   The following data was reviewed by: OCTAVIO Suarez on 03/24/2022:  CBC w/diff    CBC w/Diff 8/25/21 8/27/21 11/22/21   WBC 5.90 4.87 6.13   RBC 5.10 5.31 (A) 4.91   Hemoglobin 15.2 15.8 14.9   Hematocrit 44.1 46.5 42.4   MCV 86.5 87.6 86.4   MCH 29.8 29.8 30.3   MCHC 34.5 34.0 35.1   RDW 11.5 (A) 11.8 (A) 12.0 (A)   Platelets 225 252 293   Neutrophil Rel % 60.3 47.0 51.5   Immature Granulocyte Rel % 0.3 0.2 0.5   Lymphocyte Rel % 23.2 36.1 30.8   Monocyte Rel % 12.5 9.7 10.8   Eosinophil Rel % 3.4 6.2 5.4   Basophil Rel % 0.3 0.8 1.0   (A) Abnormal value            CMP    CMP 8/27/21 11/22/21   Glucose 89 88   BUN 9 9   Creatinine 1.01 (A) 0.85   eGFR Non African Am 65 80   Sodium 135 (A) 138   Potassium 3.9 4.4   Chloride 101 104   Calcium 9.7 9.8   Albumin 4.70 4.50   Total Bilirubin 0.5 0.3   Alkaline Phosphatase 53 57   AST (SGOT) 20 23   ALT (SGPT) 17 18   (A) Abnormal value            Lipase   Date Value Ref Range Status   08/25/2021 15 4 - 39 U/L Final     Hepatitis B Surface Ag   Date Value Ref Range Status   02/24/2021 Nonreactive Nonreactive Final     External Hepatitis C Ab   Date Value Ref Range Status   02/24/2021 Nonreactive  Nonreactive Final     Comment:     Nonreactive   Antibodies to HCV not detected, does not exclude the possiblitly of exposure to HCV        EGD 1/7/2022: LA grade a esophagitis.  Erythematous mucosa in the gastric body and antrum.  Antrum biopsy-chronic inactive gastritis.  GE junction biopsy-, Culmer mucosa with intestinal metaplasia.     Assessment and Plan    Diagnoses and all orders for this visit:    1. Obrien's esophagus without dysplasia (Primary)    2. Chronic gastritis without bleeding, unspecified gastritis type    3. Left upper quadrant abdominal pain    4. Left sided abdominal pain  -     pantoprazole (Protonix) 40 MG EC tablet; Take 1 tablet by mouth Daily.  Dispense: 30 tablet; Refill: 5    5. Abdominal bloating    Other orders  -     amitriptyline (ELAVIL) 10 MG tablet; Take 1 tablet by mouth Every Night.  Dispense: 30 tablet; Refill: 5      * Surgery not found *     We will do trial of Elavil for unresolved abdominal pain.  Discussed with patient that I feel her pain very well may be musculoskeletal and to discuss this with PCP.    Will need repeat EGD in 1 year for surveillance of Obrien's esophagus.    Sucralose test given to patient as well to evaluate symptoms of abdominal bloating and unexplained pain.  Follow Up   Return in about 4 months (around 7/24/2022).  Patient was given instructions and counseling regarding her condition or for health maintenance advice. Please see specific information pulled into the AVS if appropriate.

## 2022-04-19 ENCOUNTER — OFFICE VISIT (OUTPATIENT)
Dept: FAMILY MEDICINE CLINIC | Facility: CLINIC | Age: 27
End: 2022-04-19

## 2022-04-19 VITALS — OXYGEN SATURATION: 97 % | WEIGHT: 229 LBS | HEART RATE: 111 BPM | BODY MASS INDEX: 38.11 KG/M2 | TEMPERATURE: 98.2 F

## 2022-04-19 DIAGNOSIS — H66.001 ACUTE SUPPURATIVE OTITIS MEDIA OF RIGHT EAR WITHOUT SPONTANEOUS RUPTURE OF TYMPANIC MEMBRANE, RECURRENCE NOT SPECIFIED: ICD-10-CM

## 2022-04-19 DIAGNOSIS — B37.31 VAGINAL CANDIDIASIS: ICD-10-CM

## 2022-04-19 DIAGNOSIS — J06.9 URI WITH COUGH AND CONGESTION: Primary | ICD-10-CM

## 2022-04-19 PROCEDURE — 99213 OFFICE O/P EST LOW 20 MIN: CPT | Performed by: FAMILY MEDICINE

## 2022-04-19 RX ORDER — AMOXICILLIN 500 MG/1
500 CAPSULE ORAL 2 TIMES DAILY
Qty: 14 CAPSULE | Refills: 0 | Status: SHIPPED | OUTPATIENT
Start: 2022-04-19 | End: 2022-04-26

## 2022-04-19 RX ORDER — BENZONATATE 200 MG/1
200 CAPSULE ORAL 3 TIMES DAILY PRN
Qty: 30 CAPSULE | Refills: 0 | Status: SHIPPED | OUTPATIENT
Start: 2022-04-19 | End: 2022-10-11

## 2022-04-19 RX ORDER — FLUCONAZOLE 150 MG/1
150 TABLET ORAL DAILY
Qty: 2 TABLET | Refills: 0 | Status: SHIPPED | OUTPATIENT
Start: 2022-04-19 | End: 2022-10-11

## 2022-04-19 NOTE — PROGRESS NOTES
Chief Complaint    Cough, Earache, and Sore Throat    Subjective      Charlotte Paredes presents to [facility]    History of Present Illness    1.) ACUTE ILLNESS : Onset - > 24 hours. Patient presents with complaints of a sore throat, left ear pain and a cough. No complaints of fever and chills. No complaints of loss of taste or smell.     Objective     Vital Signs:     Pulse 111   Temp 98.2 °F (36.8 °C)   Wt 104 kg (229 lb)   SpO2 97%   BMI 38.11 kg/m²       Physical Exam  Vitals reviewed.   Constitutional:       General: She is not in acute distress.     Appearance: Normal appearance. She is well-developed.   HENT:      Head: Normocephalic and atraumatic.      Right Ear: Hearing and external ear normal. A middle ear effusion is present. Tympanic membrane is not injected.      Left Ear: Hearing and external ear normal. A middle ear effusion is present. Tympanic membrane is injected.      Nose: Nose normal.      Right Sinus: No maxillary sinus tenderness or frontal sinus tenderness.      Left Sinus: No maxillary sinus tenderness or frontal sinus tenderness.      Mouth/Throat:      Pharynx: No oropharyngeal exudate.      Tonsils: 1+ on the right. 1+ on the left.   Eyes:      General: Lids are normal.         Right eye: No discharge.         Left eye: No discharge.      Conjunctiva/sclera: Conjunctivae normal.   Pulmonary:      Effort: Pulmonary effort is normal. No respiratory distress.      Breath sounds: Normal breath sounds.   Abdominal:      General: There is no distension.   Musculoskeletal:         General: No swelling.      Cervical back: Neck supple.   Skin:     Coloration: Skin is not jaundiced.      Findings: No erythema.   Neurological:      Mental Status: She is alert. Mental status is at baseline.   Psychiatric:         Mood and Affect: Mood and affect normal.         Thought Content: Thought content normal.     Assessment and Plan     Diagnoses and all orders for this  visit:    1. URI with cough and congestion (Primary)  Comments:  Adequate fluids and rest. Antitussives PRN. Diflucan if abx induced vaginitis.     2. Acute suppurative otitis media of right ear without spontaneous rupture of tympanic membrane, recurrence not specified  Comments:  Start abx as noted.    3. Vaginal candidiasis  Comments:  See above.  Orders:  -     fluconazole (Diflucan) 150 MG tablet; Take 1 tablet by mouth Daily.  Dispense: 2 tablet; Refill: 0    Other orders  -     amoxicillin (AMOXIL) 500 MG capsule; Take 1 capsule by mouth 2 (Two) Times a Day for 7 days.  Dispense: 14 capsule; Refill: 0  -     benzonatate (TESSALON) 200 MG capsule; Take 1 capsule by mouth 3 (Three) Times a Day As Needed for Cough.  Dispense: 30 capsule; Refill: 0    Follow Up     Return if symptoms worsen or fail to improve.    Patient was given instructions and counseling regarding her condition or for health maintenance advice. Please see specific information pulled into the AVS if appropriate.

## 2022-04-21 ENCOUNTER — OFFICE VISIT (OUTPATIENT)
Dept: FAMILY MEDICINE CLINIC | Facility: CLINIC | Age: 27
End: 2022-04-21

## 2022-04-21 VITALS
SYSTOLIC BLOOD PRESSURE: 126 MMHG | HEART RATE: 130 BPM | BODY MASS INDEX: 37.11 KG/M2 | DIASTOLIC BLOOD PRESSURE: 78 MMHG | TEMPERATURE: 97.1 F | OXYGEN SATURATION: 98 % | WEIGHT: 223 LBS

## 2022-04-21 DIAGNOSIS — R05.9 COUGH: ICD-10-CM

## 2022-04-21 DIAGNOSIS — R09.89 CHEST CONGESTION: ICD-10-CM

## 2022-04-21 DIAGNOSIS — J10.1 INFLUENZA A: Primary | ICD-10-CM

## 2022-04-21 DIAGNOSIS — R53.83 FATIGUE, UNSPECIFIED TYPE: ICD-10-CM

## 2022-04-21 LAB
EXPIRATION DATE: ABNORMAL
EXPIRATION DATE: NORMAL
FLUAV AG NPH QL: POSITIVE
FLUBV AG NPH QL: NEGATIVE
INTERNAL CONTROL: ABNORMAL
INTERNAL CONTROL: NORMAL
Lab: ABNORMAL
Lab: NORMAL
SARS-COV-2 AG UPPER RESP QL IA.RAPID: NOT DETECTED

## 2022-04-21 PROCEDURE — 87426 SARSCOV CORONAVIRUS AG IA: CPT | Performed by: NURSE PRACTITIONER

## 2022-04-21 PROCEDURE — 99213 OFFICE O/P EST LOW 20 MIN: CPT | Performed by: NURSE PRACTITIONER

## 2022-04-21 PROCEDURE — 87804 INFLUENZA ASSAY W/OPTIC: CPT | Performed by: NURSE PRACTITIONER

## 2022-04-21 RX ORDER — METHYLPREDNISOLONE 4 MG/1
TABLET ORAL
Qty: 1 EACH | Refills: 0 | Status: SHIPPED | OUTPATIENT
Start: 2022-04-21 | End: 2022-09-20

## 2022-04-21 RX ORDER — OSELTAMIVIR PHOSPHATE 75 MG/1
75 CAPSULE ORAL 2 TIMES DAILY
Qty: 10 CAPSULE | Refills: 0 | Status: SHIPPED | OUTPATIENT
Start: 2022-04-21 | End: 2022-10-11

## 2022-04-21 NOTE — PROGRESS NOTES
Chief Complaint  Nasal Congestion, Cough, Sore Throat, and Headache    Subjective            Charlotte Paredes presents to [facility]  History of Present Illness     Patient is in the office today to follow-up from her visit 2 days ago.  She states that she is taken 2 full days of amoxicillin, and 1 dose so far today and she does not feel any better than she did 2 days ago.  If anything, she feels worse.  She states that her fever has seemingly gone away, but she continues to have headache, nasal congestion with rhinorrhea, sore throat, and bilateral ear pain.  She also endorses cough and chest congestion, but no significant wheezing or shortness of breath.  She was given Tessalon Perles at the time of her visit 2 days ago, but states they are not seemingly efficacious.  She takes Allegra daily and also uses Flonase.      Past Medical History:   Diagnosis Date   • Anxiety    • Asthma     inhalers prn   • Depression    • GERD (gastroesophageal reflux disease)    • Migraines    • Neurocardiogenic syncope     no episodes in 2yrs, only sees pcp   • Recurrent tonsillitis    • Seasonal allergies        Allergies   Allergen Reactions   • Cefaclor Hives        Past Surgical History:   Procedure Laterality Date   • ABDOMINAL SURGERY      ex lap   •  SECTION      x3   • ENDOSCOPY N/A 2022    Procedure: ESOPHAGOGASTRODUODENOSCOPY wtih biopsies;  Surgeon: Sirisha Thomson MD;  Location: [facility] ENDOSCOPY;  Service: Gastroenterology;  Laterality: N/A;  esophagitis and gastritis        Social History     Tobacco Use   • Smoking status: Never Smoker   • Smokeless tobacco: Never Used   Vaping Use   • Vaping Use: Never used   Substance Use Topics   • Alcohol use: Yes     Comment: rarely   • Drug use: Never       Family History   Problem Relation Age of Onset   • Anemia Mother    • COPD Mother    • Depression Mother    • Alcohol abuse Mother    • Alcohol abuse Father    • Depression  Brother    • Alcohol abuse Brother    • Arthritis Brother    • Diabetes type II Maternal Grandmother    • Hypertension Maternal Grandmother    • Breast cancer Maternal Grandmother    • COPD Maternal Grandfather    • Alcohol abuse Maternal Grandfather    • Liver cancer Maternal Grandfather    • Hyperlipidemia Paternal Grandfather    • Hypertension Paternal Grandfather         Health Maintenance Due   Topic Date Due   • ANNUAL PHYSICAL  Never done   • Pneumococcal Vaccine 0-64 (2 - PCV) 09/07/2018   • PAP SMEAR  Never done        Current Outpatient Medications on File Prior to Visit   Medication Sig   • amitriptyline (ELAVIL) 10 MG tablet Take 1 tablet by mouth Every Night.   • amoxicillin (AMOXIL) 500 MG capsule Take 1 capsule by mouth 2 (Two) Times a Day for 7 days.   • benzonatate (TESSALON) 200 MG capsule Take 1 capsule by mouth 3 (Three) Times a Day As Needed for Cough.   • fexofenadine (ALLEGRA) 180 MG tablet Take 180 mg by mouth Daily.   • fluconazole (Diflucan) 150 MG tablet Take 1 tablet by mouth Daily.   • fluticasone (FLONASE) 50 MCG/ACT nasal spray 1-2 sprays by Each Nare route Daily.   • ibuprofen (ADVIL,MOTRIN) 800 MG tablet Take 1 tablet by mouth Every 8 (Eight) Hours As Needed for Mild Pain .   • levonorgestrel (MIRENA) 20 MCG/24HR IUD 1 each by Intrauterine route.   • pantoprazole (Protonix) 40 MG EC tablet Take 1 tablet by mouth Daily.     No current facility-administered medications on file prior to visit.       Immunization History   Administered Date(s) Administered   • COVID-19 (PFIZER) PURPLE CAP 10/20/2021, 01/05/2022   • HPV Quadrivalent 08/15/2008   • Pneumococcal Polysaccharide (PPSV23) 09/07/2017   • Tdap 08/15/2008, 03/11/2014       Review of Systems     Objective     /78   Pulse (!) 130   Temp 97.1 °F (36.2 °C)   Wt 101 kg (223 lb)   SpO2 98%   BMI 37.11 kg/m²       Physical Exam  Vitals reviewed.   Constitutional:       General: She is not in acute distress.     Appearance:  She is well-developed. She is obese.   HENT:      Head: Normocephalic and atraumatic.      Right Ear: Tympanic membrane, ear canal and external ear normal. There is no impacted cerumen.      Left Ear: Tympanic membrane, ear canal and external ear normal. There is no impacted cerumen.      Nose: Congestion present. No rhinorrhea.      Mouth/Throat:      Mouth: Mucous membranes are moist.      Pharynx: Oropharynx is clear. No oropharyngeal exudate or posterior oropharyngeal erythema.   Eyes:      General: No scleral icterus.     Extraocular Movements: Extraocular movements intact.      Conjunctiva/sclera: Conjunctivae normal.   Neck:      Trachea: Trachea normal.   Cardiovascular:      Rate and Rhythm: Normal rate and regular rhythm.      Pulses: Normal pulses.      Heart sounds: No murmur heard.  Pulmonary:      Effort: Pulmonary effort is normal. No respiratory distress.      Breath sounds: Normal breath sounds. No wheezing, rhonchi or rales.      Comments: Dry cough witnessed.  Deep and bark-like, but nonproductive.  Musculoskeletal:         General: Normal range of motion.      Cervical back: Normal range of motion and neck supple.   Lymphadenopathy:      Cervical: No cervical adenopathy.   Skin:     General: Skin is warm and dry.   Neurological:      Mental Status: She is alert and oriented to person, place, and time.   Psychiatric:         Mood and Affect: Mood and affect normal.         Behavior: Behavior normal.         Thought Content: Thought content normal.         Judgment: Judgment normal.         Result Review :     The following data was reviewed by: OCTAVIO Steinberg on 04/21/2022:    POCT SARS-CoV-2 Antigen GARDENIA (04/21/2022 13:24)  POCT Influenza A/B (04/21/2022 13:24)                Assessment and Plan      Diagnoses and all orders for this visit:    1. Influenza A (Primary)  -     oseltamivir (Tamiflu) 75 MG capsule; Take 1 capsule by mouth 2 (Two) Times a Day.  Dispense: 10 capsule; Refill:  0    2. Fatigue, unspecified type  -     POCT SARS-CoV-2 Antigen GARDENIA  -     POCT Influenza A/B    3. Chest congestion  -     POCT SARS-CoV-2 Antigen GARDENIA  -     POCT Influenza A/B  -     methylPREDNISolone (MEDROL) 4 MG dose pack; Take as directed on package instructions.  Dispense: 1 each; Refill: 0    4. Cough  -     POCT SARS-CoV-2 Antigen GARDENIA  -     POCT Influenza A/B  -     methylPREDNISolone (MEDROL) 4 MG dose pack; Take as directed on package instructions.  Dispense: 1 each; Refill: 0            Follow Up     Return if symptoms worsen or fail to improve.     Advised the patient that she has influenza.  I will prescribe Tamiflu as her symptoms have seemingly progressed in the past 24 hours; however, I did explain to her that Tamiflu works best when prescribed within the first 72 hours of onset of symptoms.  I will also give her a Medrol Dosepak.  She may continue Tessalon Perles.  No need to continue the antibiotic.  Call or return to the clinic with any progressive or worsening symptoms.    Patient was given instructions and counseling regarding her condition or for health maintenance advice. Please see specific information pulled into the AVS if appropriate.     Charlotte M Lena  reports that she has never smoked. She has never used smokeless tobacco.

## 2022-07-27 PROBLEM — R52 PAIN: Status: ACTIVE | Noted: 2022-07-27

## 2022-09-20 ENCOUNTER — OFFICE VISIT (OUTPATIENT)
Dept: FAMILY MEDICINE CLINIC | Facility: CLINIC | Age: 27
End: 2022-09-20

## 2022-09-20 VITALS — TEMPERATURE: 96.9 F | OXYGEN SATURATION: 98 % | HEART RATE: 92 BPM

## 2022-09-20 DIAGNOSIS — J02.9 PHARYNGITIS, UNSPECIFIED ETIOLOGY: Primary | ICD-10-CM

## 2022-09-20 PROCEDURE — 99213 OFFICE O/P EST LOW 20 MIN: CPT | Performed by: FAMILY MEDICINE

## 2022-09-20 RX ORDER — PREDNISONE 20 MG/1
40 TABLET ORAL DAILY
Qty: 10 TABLET | Refills: 0 | Status: SHIPPED | OUTPATIENT
Start: 2022-09-20 | End: 2022-09-25

## 2022-09-20 RX ORDER — AZITHROMYCIN 250 MG/1
TABLET, FILM COATED ORAL
Qty: 6 TABLET | Refills: 0 | Status: SHIPPED | OUTPATIENT
Start: 2022-09-20 | End: 2022-10-11

## 2022-09-20 NOTE — PROGRESS NOTES
Chief Complaint  Sore Throat and Generalized Body Aches    Subjective          Charlotte Paredes presents to [facility]  History of Present Illness  Pt says that she has had 2 neg home covid tests.  URI   This is a new problem. Episode onset: 3-4 days. The problem has been gradually worsening. There has been no fever. Associated symptoms include congestion and a sore throat. Pertinent negatives include no abdominal pain, chest pain, coughing, diarrhea, dysuria, ear pain, headaches, joint pain, joint swelling, nausea, neck pain, plugged ear sensation, rash, rhinorrhea, sinus pain, sneezing, swollen glands, vomiting or wheezing. She has tried nothing for the symptoms.       Objective   Allergies   Allergen Reactions   • Cefaclor Hives     Immunization History   Administered Date(s) Administered   • COVID-19 (PFIZER) PURPLE CAP 2021, 2021, 10/20/2021, 2022   • FluLaval/Fluzone >6mos 10/20/2021   • HPV Quadrivalent 08/15/2008   • Influenza, Unspecified 10/20/2021   • Meningococcal MCV4P (Menactra) 08/15/2008   • Pneumococcal Polysaccharide (PPSV23) 2017   • Tdap 08/15/2008, 2014     Past Medical History:   Diagnosis Date   • Anxiety    • Asthma     inhalers prn   • Depression    • GERD (gastroesophageal reflux disease)    • Migraines    • Neurocardiogenic syncope     no episodes in 2yrs, only sees pcp   • Recurrent tonsillitis    • Seasonal allergies       Past Surgical History:   Procedure Laterality Date   • ABDOMINAL SURGERY      ex lap   •  SECTION      x3   • ENDOSCOPY N/A 2022    Procedure: ESOPHAGOGASTRODUODENOSCOPY wtih biopsies;  Surgeon: Sirisha Thomson MD;  Location: [facility] ENDOSCOPY;  Service: Gastroenterology;  Laterality: N/A;  esophagitis and gastritis      Social History     Socioeconomic History   • Marital status: Single   Tobacco Use   • Smoking status: Never Smoker   • Smokeless tobacco: Never Used   Vaping Use   •  Vaping Use: Never used   Substance and Sexual Activity   • Alcohol use: Yes     Comment: rarely   • Drug use: Never   • Sexual activity: Defer        Current Outpatient Medications:   •  amitriptyline (ELAVIL) 10 MG tablet, Take 1 tablet by mouth Every Night., Disp: 30 tablet, Rfl: 5  •  fexofenadine (ALLEGRA) 180 MG tablet, Take 180 mg by mouth Daily., Disp: , Rfl:   •  fluticasone (FLONASE) 50 MCG/ACT nasal spray, 1-2 sprays by Each Nare route Daily., Disp: , Rfl:   •  ibuprofen (ADVIL,MOTRIN) 800 MG tablet, Take 1 tablet by mouth Every 8 (Eight) Hours As Needed for Mild Pain ., Disp: 21 tablet, Rfl: 1  •  levonorgestrel (MIRENA) 20 MCG/24HR IUD, 1 each by Intrauterine route., Disp: , Rfl:   •  pantoprazole (Protonix) 40 MG EC tablet, Take 1 tablet by mouth Daily., Disp: 30 tablet, Rfl: 5  •  azithromycin (Zithromax Z-Jamar) 250 MG tablet, Take 2 tablets by mouth on day 1, then 1 tablet daily on days 2-5, Disp: 6 tablet, Rfl: 0  •  benzonatate (TESSALON) 200 MG capsule, Take 1 capsule by mouth 3 (Three) Times a Day As Needed for Cough., Disp: 30 capsule, Rfl: 0  •  fluconazole (Diflucan) 150 MG tablet, Take 1 tablet by mouth Daily., Disp: 2 tablet, Rfl: 0  •  oseltamivir (Tamiflu) 75 MG capsule, Take 1 capsule by mouth 2 (Two) Times a Day., Disp: 10 capsule, Rfl: 0  •  predniSONE (DELTASONE) 20 MG tablet, Take 2 tablets by mouth Daily for 5 days., Disp: 10 tablet, Rfl: 0   Family History   Problem Relation Age of Onset   • Anemia Mother    • COPD Mother    • Depression Mother    • Alcohol abuse Mother    • Alcohol abuse Father    • Depression Brother    • Alcohol abuse Brother    • Arthritis Brother    • Diabetes type II Maternal Grandmother    • Hypertension Maternal Grandmother    • Breast cancer Maternal Grandmother    • COPD Maternal Grandfather    • Alcohol abuse Maternal Grandfather    • Liver cancer Maternal Grandfather    • Hyperlipidemia Paternal Grandfather    • Hypertension Paternal Grandfather            Vital Signs:   Vitals:    09/20/22 1602   Pulse: 92   Temp: 96.9 °F (36.1 °C)   SpO2: 98%       Review of Systems   HENT: Positive for congestion and sore throat. Negative for ear pain, rhinorrhea, sinus pain and sneezing.    Respiratory: Negative for cough and wheezing.    Cardiovascular: Negative for chest pain.   Gastrointestinal: Negative for abdominal pain, diarrhea, nausea and vomiting.   Genitourinary: Negative for dysuria.   Musculoskeletal: Negative for joint pain and neck pain.   Skin: Negative for rash.   Neurological: Negative for headaches.      Physical Exam  Vitals reviewed.   Constitutional:       Appearance: Normal appearance. She is well-developed.   HENT:      Head: Normocephalic and atraumatic.      Right Ear: External ear normal.      Left Ear: External ear normal.      Nose: Nose normal.      Mouth/Throat:      Mouth: Mucous membranes are moist.      Pharynx: Oropharynx is clear. Posterior oropharyngeal erythema present. No oropharyngeal exudate.   Eyes:      Conjunctiva/sclera: Conjunctivae normal.      Pupils: Pupils are equal, round, and reactive to light.   Cardiovascular:      Rate and Rhythm: Normal rate and regular rhythm.      Pulses: Normal pulses.      Heart sounds: Normal heart sounds. No murmur heard.    No friction rub. No gallop.   Pulmonary:      Effort: Pulmonary effort is normal.      Breath sounds: Normal breath sounds. No wheezing or rhonchi.   Abdominal:      General: Abdomen is flat. Bowel sounds are normal.      Palpations: Abdomen is soft.   Musculoskeletal:         General: Normal range of motion.      Cervical back: Normal range of motion and neck supple.   Skin:     General: Skin is warm and dry.      Capillary Refill: Capillary refill takes less than 2 seconds.   Neurological:      General: No focal deficit present.      Mental Status: She is alert and oriented to person, place, and time.      Cranial Nerves: No cranial nerve deficit.   Psychiatric:         Mood  and Affect: Mood and affect normal.         Behavior: Behavior normal.         Thought Content: Thought content normal.         Judgment: Judgment normal.        Result Review :                 Assessment and Plan    Diagnoses and all orders for this visit:    1. Pharyngitis, unspecified etiology (Primary)  -     azithromycin (Zithromax Z-Jamar) 250 MG tablet; Take 2 tablets by mouth on day 1, then 1 tablet daily on days 2-5  Dispense: 6 tablet; Refill: 0  -     predniSONE (DELTASONE) 20 MG tablet; Take 2 tablets by mouth Daily for 5 days.  Dispense: 10 tablet; Refill: 0            Follow Up   Return if symptoms worsen or fail to improve.  Patient was given instructions and counseling regarding her condition or for health maintenance advice. Please see specific information pulled into the AVS if appropriate.

## 2022-09-26 ENCOUNTER — OFFICE VISIT (OUTPATIENT)
Dept: GASTROENTEROLOGY | Facility: CLINIC | Age: 27
End: 2022-09-26

## 2022-09-26 VITALS
SYSTOLIC BLOOD PRESSURE: 116 MMHG | DIASTOLIC BLOOD PRESSURE: 67 MMHG | WEIGHT: 230.4 LBS | OXYGEN SATURATION: 99 % | HEART RATE: 87 BPM | BODY MASS INDEX: 38.34 KG/M2

## 2022-09-26 DIAGNOSIS — K22.70 BARRETT'S ESOPHAGUS WITHOUT DYSPLASIA: Primary | ICD-10-CM

## 2022-09-26 DIAGNOSIS — R11.0 NAUSEA: ICD-10-CM

## 2022-09-26 DIAGNOSIS — R10.10 UPPER ABDOMINAL PAIN: ICD-10-CM

## 2022-09-26 PROCEDURE — 99214 OFFICE O/P EST MOD 30 MIN: CPT | Performed by: NURSE PRACTITIONER

## 2022-09-26 RX ORDER — ESOMEPRAZOLE MAGNESIUM 40 MG/1
40 CAPSULE, DELAYED RELEASE ORAL DAILY
Qty: 90 CAPSULE | Refills: 1 | Status: SHIPPED | OUTPATIENT
Start: 2022-09-26

## 2022-09-26 RX ORDER — FAMOTIDINE 40 MG/1
40 TABLET, FILM COATED ORAL DAILY
Qty: 90 TABLET | Refills: 1 | Status: SHIPPED | OUTPATIENT
Start: 2022-09-26

## 2022-09-26 NOTE — PROGRESS NOTES
"  Chief Complaint  Follow-up (4 month barretts, acid reflux. )    History of Present Illness  Charlotte Paredes is a 27 y.o. who presents to [facility] for follow up of Follow-up (4 month barretts, acid reflux. ).    Ms. Paredes reports that she is continuing to have severe heartburn daily. No change with increase in protonix. Has nauseated often throughout the day without vomiting. Does \"spit up acid\" often. Denies any dysphagia. Consumes 1 soda daily and an occasional cup of coffee. Avoiding NSAID besides \"occasional aleve\".  Tends to eat mostly after lunch due to nausea in the AM. Weight stable.     Continues to have upper abdominal pain, under ribs that is constant but ranges in intensity. Currently seeing pain management as well and is having nerve blocks done with moderate relief.  Reports she tried elavil for 2-3 weeks and \" I did not like the way it made me feel\". She discontinued.     Will Schedule EGD today for Obrien's esophagus 1 yr f/u.    Labs Result Review Imaging    Past Medical History:   Diagnosis Date   • Anxiety    • Asthma     inhalers prn   • Depression    • GERD (gastroesophageal reflux disease)    • Migraines    • Neurocardiogenic syncope     no episodes in 2yrs, only sees pcp   • Recurrent tonsillitis    • Seasonal allergies        Past Surgical History:   Procedure Laterality Date   • ABDOMINAL SURGERY      ex lap   •  SECTION      x3   • ENDOSCOPY N/A 2022    Procedure: ESOPHAGOGASTRODUODENOSCOPY [facility] biopsies;  Surgeon: Sirisha Thomson MD;  Location: [facility] ENDOSCOPY;  Service: Gastroenterology;  Laterality: N/A;  esophagitis and gastritis   • UPPER GASTROINTESTINAL ENDOSCOPY         Current Outpatient Medications on File Prior to Visit   Medication Sig Dispense Refill   • fexofenadine (ALLEGRA) 180 MG tablet Take 180 mg by mouth Daily.     • ibuprofen (ADVIL,MOTRIN) 800 MG tablet Take 1 tablet by mouth Every 8 (Eight) Hours As Needed " for Mild Pain . 21 tablet 1   • levonorgestrel (MIRENA) 20 MCG/24HR IUD 1 each by Intrauterine route.     • [DISCONTINUED] pantoprazole (Protonix) 40 MG EC tablet Take 1 tablet by mouth Daily. 30 tablet 5   • amitriptyline (ELAVIL) 10 MG tablet Take 1 tablet by mouth Every Night. 30 tablet 5   • azithromycin (Zithromax Z-Jamar) 250 MG tablet Take 2 tablets by mouth on day 1, then 1 tablet daily on days 2-5 6 tablet 0   • benzonatate (TESSALON) 200 MG capsule Take 1 capsule by mouth 3 (Three) Times a Day As Needed for Cough. 30 capsule 0   • fluconazole (Diflucan) 150 MG tablet Take 1 tablet by mouth Daily. 2 tablet 0   • fluticasone (FLONASE) 50 MCG/ACT nasal spray 1-2 sprays by Each Nare route Daily.     • oseltamivir (Tamiflu) 75 MG capsule Take 1 capsule by mouth 2 (Two) Times a Day. 10 capsule 0   • [] predniSONE (DELTASONE) 20 MG tablet Take 2 tablets by mouth Daily for 5 days. 10 tablet 0     No current facility-administered medications on file prior to visit.       Social History     Social History Narrative   • Not on file         Objective     Review of Systems   Gastrointestinal: Positive for nausea and GERD.        Vital Signs:   /67 (BP Location: Left arm, Patient Position: Sitting, Cuff Size: Large Adult)   Pulse 87   Wt 105 kg (230 lb 6.4 oz)   SpO2 99%   BMI 38.34 kg/m²       Physical Exam  Constitutional:       General: She is not in acute distress.     Appearance: Normal appearance. She is well-developed. She is obese.   HENT:      Head: Normocephalic and atraumatic.   Eyes:      Conjunctiva/sclera: Conjunctivae normal.      Pupils: Pupils are equal, round, and reactive to light.      Visual Fields: Right eye visual fields normal and left eye visual fields normal.   Cardiovascular:      Rate and Rhythm: Normal rate and regular rhythm.      Heart sounds: Normal heart sounds.   Pulmonary:      Effort: Pulmonary effort is normal. No retractions.      Breath sounds: Normal breath sounds  and air entry.   Abdominal:      General: Bowel sounds are normal. There is no distension.      Palpations: Abdomen is soft.      Tenderness: There is abdominal tenderness in the left upper quadrant.      Comments: No appreciable hepatosplenomegaly or ascites   Musculoskeletal:         General: Normal range of motion.      Cervical back: Normal range of motion and neck supple.   Skin:     General: Skin is warm and dry.   Neurological:      Mental Status: She is alert and oriented to person, place, and time.   Psychiatric:         Mood and Affect: Mood and affect normal.         Behavior: Behavior normal.         Result Review :   The following data was reviewed by: OCTAVIO Suarez on 09/26/2022:  CBC w/diff    CBC w/Diff 11/22/21 7/6/22   WBC 6.13 6.74   RBC 4.91 5.20   Hemoglobin 14.9 15.5   Hematocrit 42.4 46.0   MCV 86.4 88.5   MCH 30.3 29.8   MCHC 35.1 33.7   RDW 12.0 (A) 12.1   Platelets 293 266   Neutrophil Rel % 51.5 87.5 (A)   Immature Granulocyte Rel % 0.5 0.6   Lymphocyte Rel % 30.8 9.5 (A)   Monocyte Rel % 10.8 1.8   Eosinophil Rel % 5.4 0.3   Basophil Rel % 1.0 0.3   (A) Abnormal value            CMP    CMP 11/22/21   Glucose 88   BUN 9   Creatinine 0.85   eGFR Non African Am 80   Sodium 138   Potassium 4.4   Chloride 104   Calcium 9.8   Albumin 4.50   Total Bilirubin 0.3   Alkaline Phosphatase 57   AST (SGOT) 23   ALT (SGPT) 18           Lipase   Date Value Ref Range Status   03/27/2022 33 22 - 51 Units/Liter Final     Hepatitis B Surface Ag   Date Value Ref Range Status   07/06/2022 Nonreactive Nonreactive Final     External Hepatitis C Ab   Date Value Ref Range Status   07/06/2022 Nonreactive Nonreactive Final     Comment:     No evidence of Hepatitis C infection.  Does not exclude the possibility of exposure to HCV, antibody level may be below the cutoff in early infection.     Protime   Date Value Ref Range Status   07/07/2022 11.0 9.7 - 11.5 Second Final     Comment:     When using the PT  "to screen for a coagulopathy, please refer to the PT reference range to evaluate results.     INR   Date Value Ref Range Status   07/07/2022 1.0  Final     Comment:     To monitor warfarin, the INR calculated from the PT is used, according to current published guidelines:    *Most indications, moderate intensity INR (2.0-3.0) is effective.    *Patients with recurrent thromboembolic events with a therapeutic INR as above, or other additional risk factors for thromboembolic events, high intensity INR (2.5-3.5) is recommended.    *Optimal target range for the INR is not likely to be the same for all indications, and lower INR targets may be prudent for patients at very high risk of bleeding.    The INR is used to manage patients on warfarin, and thus is not compared to a \"normal\" INR range. The validity of the INR in other conditions of impaired coagulation has not been fully evaluated, and the PT recorded in seconds is recommended in these   instances. Ref:  CHEST June 2008 supplement.             Assessment and Plan    Diagnoses and all orders for this visit:    1. Orbien's esophagus without dysplasia (Primary)  -     Case Request; Standing  -     Case Request    2. Nausea    3. Upper abdominal pain    Other orders  -     esomeprazole (nexIUM) 40 MG capsule; Take 1 capsule by mouth Daily.  Dispense: 90 capsule; Refill: 1  -     famotidine (Pepcid) 40 MG tablet; Take 1 tablet by mouth Daily.  Dispense: 90 tablet; Refill: 1  -     Follow Anesthesia Guidelines / Protocol; Future  -     Obtain Informed Consent; Future      ESOPHAGOGASTRODUODENOSCOPY (N/A)       Follow Up   Return for Follow up after procedure.  Patient was given instructions and counseling regarding her condition or for health maintenance advice. Please see specific information pulled into the AVS if appropriate.    "

## 2022-10-11 ENCOUNTER — OFFICE VISIT (OUTPATIENT)
Dept: FAMILY MEDICINE CLINIC | Facility: CLINIC | Age: 27
End: 2022-10-11

## 2022-10-11 VITALS — HEART RATE: 95 BPM | TEMPERATURE: 98 F | OXYGEN SATURATION: 99 %

## 2022-10-11 DIAGNOSIS — J02.9 PHARYNGITIS, UNSPECIFIED ETIOLOGY: Primary | ICD-10-CM

## 2022-10-11 DIAGNOSIS — J01.90 ACUTE NON-RECURRENT SINUSITIS, UNSPECIFIED LOCATION: ICD-10-CM

## 2022-10-11 LAB
EXPIRATION DATE: NORMAL
EXPIRATION DATE: NORMAL
FLUAV AG UPPER RESP QL IA.RAPID: NOT DETECTED
FLUBV AG UPPER RESP QL IA.RAPID: NOT DETECTED
INTERNAL CONTROL: NORMAL
INTERNAL CONTROL: NORMAL
Lab: NORMAL
Lab: NORMAL
S PYO AG THROAT QL: NEGATIVE
SARS-COV-2 AG UPPER RESP QL IA.RAPID: NOT DETECTED

## 2022-10-11 PROCEDURE — 87880 STREP A ASSAY W/OPTIC: CPT | Performed by: FAMILY MEDICINE

## 2022-10-11 PROCEDURE — 99213 OFFICE O/P EST LOW 20 MIN: CPT | Performed by: FAMILY MEDICINE

## 2022-10-11 PROCEDURE — 87428 SARSCOV & INF VIR A&B AG IA: CPT | Performed by: FAMILY MEDICINE

## 2022-10-11 RX ORDER — DEXTROMETHORPHAN HYDROBROMIDE AND PROMETHAZINE HYDROCHLORIDE 15; 6.25 MG/5ML; MG/5ML
5 SYRUP ORAL NIGHTLY PRN
Qty: 118 ML | Refills: 0 | Status: SHIPPED | OUTPATIENT
Start: 2022-10-11 | End: 2023-01-17

## 2022-10-11 RX ORDER — BENZONATATE 200 MG/1
200 CAPSULE ORAL 3 TIMES DAILY PRN
Qty: 30 CAPSULE | Refills: 0 | Status: SHIPPED | OUTPATIENT
Start: 2022-10-11 | End: 2023-01-17

## 2022-10-11 RX ORDER — AZITHROMYCIN 250 MG/1
TABLET, FILM COATED ORAL
Qty: 6 TABLET | Refills: 0 | Status: SHIPPED | OUTPATIENT
Start: 2022-10-11 | End: 2023-01-17

## 2022-10-11 NOTE — PROGRESS NOTES
Chief Complaint    Sore Throat (Sore throat/body aches/chills/nasal congestion and drainage/ear pain x 3 days.  Taking OTC DayQuil/NyQuil)    Subjective      Charlotte Paredes presents to [facility]    History of Present Illness    1.) ACUTE ILLNESS : Onset - 3 days ago. Per patient - sore throat, nasal/sinus congestion. She is also c/o chills, myalgias and bilateral ear drainage x 3 days. No complaints of loss of taste or smell. History of asthma - mild - rare use of rescue inhaler.     Objective     Vital Signs:     Pulse 95   Temp 98 °F (36.7 °C) (Temporal)   SpO2 99%       Physical Exam  Vitals reviewed.   Constitutional:       General: She is not in acute distress.     Appearance: Normal appearance. She is well-developed.   HENT:      Head: Normocephalic and atraumatic.      Right Ear: Hearing and external ear normal.      Left Ear: Hearing and external ear normal.      Nose: Nose normal.   Eyes:      General: Lids are normal.         Right eye: No discharge.         Left eye: No discharge.      Conjunctiva/sclera: Conjunctivae normal.   Pulmonary:      Effort: Pulmonary effort is normal.   Abdominal:      General: There is no distension.   Musculoskeletal:         General: No swelling.      Cervical back: Neck supple.   Skin:     Coloration: Skin is not jaundiced.      Findings: No erythema.   Neurological:      Mental Status: She is alert. Mental status is at baseline.   Psychiatric:         Mood and Affect: Mood and affect normal.         Thought Content: Thought content normal.     Assessment and Plan     Diagnoses and all orders for this visit:    1. Pharyngitis, unspecified etiology (Primary)  Comments:  Negative rapid testing her in office.   Orders:  -     POCT SARS-CoV-2 Antigen [place] + Flu  -     POCT rapid strep A    2. Acute non-recurrent sinusitis, unspecified location  Comments:  Start abx due to severity of sxs. Antitussives PRN. Adequate fluids and rest. If onset of  wheezing, call ofc and will prescribe PO steroid course.     Other orders  -     azithromycin (Zithromax Z-Jamar) 250 MG tablet; Take 2 tablets by mouth on day 1, then 1 tablet daily on days 2-5  Dispense: 6 tablet; Refill: 0  -     benzonatate (TESSALON) 200 MG capsule; Take 1 capsule by mouth 3 (Three) Times a Day As Needed for Cough.  Dispense: 30 capsule; Refill: 0  -     promethazine-dextromethorphan (PROMETHAZINE-DM) 6.25-15 MG/5ML syrup; Take 5 mL by mouth At Night As Needed for Cough.  Dispense: 118 mL; Refill: 0    Follow Up : As needed.     Patient was given instructions and counseling regarding her condition or for health maintenance advice. Please see specific information pulled into the AVS if appropriate.

## 2022-10-12 ENCOUNTER — TELEPHONE (OUTPATIENT)
Dept: FAMILY MEDICINE CLINIC | Facility: CLINIC | Age: 27
End: 2022-10-12

## 2022-10-12 NOTE — TELEPHONE ENCOUNTER
Caller: Charlotte Paredes    Relationship: Self    Best call back number:[phone]    What is the best time to reach you: ANY    Who are you requesting to speak with (clinical staff, provider,  specific staff member): CLINICAL      What was the call regarding: PATIENT WAS JUST SEEN YESTERDAY, TOLD COVID RESULTS WERE NEGATIVE. HOWEVER HER BROTHER THAT RESIDES WITH HER TOOK A RAPID TEST AND IS POSITIVE FOR COVID. SHE NEEDS TO KNOW WHAT TO DO. IF SHE NEEDS TO COME BACK IN AND RETEST.    Do you require a callback: YES

## 2022-10-20 ENCOUNTER — OFFICE VISIT (OUTPATIENT)
Dept: FAMILY MEDICINE CLINIC | Facility: CLINIC | Age: 27
End: 2022-10-20

## 2022-10-20 VITALS — HEART RATE: 93 BPM | OXYGEN SATURATION: 97 % | TEMPERATURE: 97.1 F

## 2022-10-20 DIAGNOSIS — J01.00 ACUTE NON-RECURRENT MAXILLARY SINUSITIS: Primary | ICD-10-CM

## 2022-10-20 DIAGNOSIS — J02.9 PHARYNGITIS, UNSPECIFIED ETIOLOGY: ICD-10-CM

## 2022-10-20 PROCEDURE — 99213 OFFICE O/P EST LOW 20 MIN: CPT | Performed by: FAMILY MEDICINE

## 2022-10-20 RX ORDER — DOXYCYCLINE HYCLATE 100 MG/1
100 CAPSULE ORAL 2 TIMES DAILY
Qty: 14 CAPSULE | Refills: 0 | Status: SHIPPED | OUTPATIENT
Start: 2022-10-20 | End: 2022-10-27

## 2022-10-20 RX ORDER — PREDNISONE 20 MG/1
60 TABLET ORAL DAILY
Qty: 15 TABLET | Refills: 0 | Status: SHIPPED | OUTPATIENT
Start: 2022-10-20 | End: 2022-10-25

## 2022-10-20 NOTE — PROGRESS NOTES
Chief Complaint  Cough (Follow-up Cough and congestion. )    Subjective          Charlotte Paredes presents to [facility]  URI   This is a new problem. Episode onset: 2 weeks. The problem has been unchanged. There has been no fever. Associated symptoms include congestion, coughing, sinus pain and a sore throat. Pertinent negatives include no abdominal pain, chest pain, diarrhea, dysuria, ear pain, headaches, joint pain, joint swelling, nausea, neck pain, plugged ear sensation, rash, rhinorrhea, sneezing, swollen glands, vomiting or wheezing. Treatments tried: z-pack, medrol dosepak. The treatment provided no relief.       Objective   Allergies   Allergen Reactions   • Cefaclor Hives     Immunization History   Administered Date(s) Administered   • COVID-19 (PFIZER) PURPLE CAP 2021, 2021, 10/20/2021, 2022   • FluLaval/Fluzone >6mos 10/20/2021   • HPV Quadrivalent 08/15/2008   • Influenza, Unspecified 10/20/2021   • Meningococcal MCV4P (Menactra) 08/15/2008   • Pneumococcal Polysaccharide (PPSV23) 2017   • Tdap 08/15/2008, 2014     Past Medical History:   Diagnosis Date   • Anxiety    • Asthma     inhalers prn   • Depression    • GERD (gastroesophageal reflux disease)    • Migraines    • Neurocardiogenic syncope     no episodes in 2yrs, only sees pcp   • Recurrent tonsillitis    • Seasonal allergies       Past Surgical History:   Procedure Laterality Date   • ABDOMINAL SURGERY      ex lap   •  SECTION      x3   • ENDOSCOPY N/A 2022    Procedure: ESOPHAGOGASTRODUODENOSCOPY [facility] biopsies;  Surgeon: Sirisha Thomson MD;  Location: [facility] ENDOSCOPY;  Service: Gastroenterology;  Laterality: N/A;  esophagitis and gastritis   • UPPER GASTROINTESTINAL ENDOSCOPY        Social History     Socioeconomic History   • Marital status: Single   Tobacco Use   • Smoking status: Never   • Smokeless tobacco: Never   Vaping Use   • Vaping Use: Never used    Substance and Sexual Activity   • Alcohol use: Not Currently     Comment: rarely   • Drug use: Never   • Sexual activity: Defer        Current Outpatient Medications:   •  amitriptyline (ELAVIL) 10 MG tablet, Take 1 tablet by mouth Every Night., Disp: 30 tablet, Rfl: 5  •  benzonatate (TESSALON) 200 MG capsule, Take 1 capsule by mouth 3 (Three) Times a Day As Needed for Cough., Disp: 30 capsule, Rfl: 0  •  esomeprazole (nexIUM) 40 MG capsule, Take 1 capsule by mouth Daily., Disp: 90 capsule, Rfl: 1  •  famotidine (Pepcid) 40 MG tablet, Take 1 tablet by mouth Daily., Disp: 90 tablet, Rfl: 1  •  fexofenadine (ALLEGRA) 180 MG tablet, Take 180 mg by mouth Daily., Disp: , Rfl:   •  fluticasone (FLONASE) 50 MCG/ACT nasal spray, 1-2 sprays by Each Nare route Daily., Disp: , Rfl:   •  levonorgestrel (MIRENA) 20 MCG/24HR IUD, 1 each by Intrauterine route., Disp: , Rfl:   •  promethazine-dextromethorphan (PROMETHAZINE-DM) 6.25-15 MG/5ML syrup, Take 5 mL by mouth At Night As Needed for Cough., Disp: 118 mL, Rfl: 0  •  azithromycin (Zithromax Z-Jamar) 250 MG tablet, Take 2 tablets by mouth on day 1, then 1 tablet daily on days 2-5, Disp: 6 tablet, Rfl: 0  •  doxycycline (VIBRAMYCIN) 100 MG capsule, Take 1 capsule by mouth 2 (Two) Times a Day for 7 days., Disp: 14 capsule, Rfl: 0  •  predniSONE (DELTASONE) 20 MG tablet, Take 3 tablets by mouth Daily for 5 days., Disp: 15 tablet, Rfl: 0   Family History   Problem Relation Age of Onset   • Anemia Mother    • COPD Mother    • Depression Mother    • Alcohol abuse Mother    • Alcohol abuse Father    • Depression Brother    • Alcohol abuse Brother    • Arthritis Brother    • Diabetes type II Maternal Grandmother    • Hypertension Maternal Grandmother    • Breast cancer Maternal Grandmother    • COPD Maternal Grandfather    • Alcohol abuse Maternal Grandfather    • Liver cancer Maternal Grandfather    • Hyperlipidemia Paternal Grandfather    • Hypertension Paternal Grandfather            Vital Signs:   Vitals:    10/20/22 1650   Pulse: 93   Temp: 97.1 °F (36.2 °C)   SpO2: 97%       Review of Systems   HENT: Positive for congestion, sinus pain and sore throat. Negative for ear pain, rhinorrhea and sneezing.    Respiratory: Positive for cough. Negative for wheezing.    Cardiovascular: Negative for chest pain.   Gastrointestinal: Negative for abdominal pain, diarrhea, nausea and vomiting.   Genitourinary: Negative for dysuria.   Musculoskeletal: Negative for joint pain and neck pain.   Skin: Negative for rash.   Neurological: Negative for headaches.      Physical Exam   Result Review :                 Assessment and Plan    Diagnoses and all orders for this visit:    1. Acute non-recurrent maxillary sinusitis (Primary)  -     doxycycline (VIBRAMYCIN) 100 MG capsule; Take 1 capsule by mouth 2 (Two) Times a Day for 7 days.  Dispense: 14 capsule; Refill: 0  -     predniSONE (DELTASONE) 20 MG tablet; Take 3 tablets by mouth Daily for 5 days.  Dispense: 15 tablet; Refill: 0    2. Pharyngitis, unspecified etiology            Follow Up   Return if symptoms worsen or fail to improve.  Patient was given instructions and counseling regarding her condition or for health maintenance advice. Please see specific information pulled into the AVS if appropriate.

## 2023-01-17 ENCOUNTER — OFFICE VISIT (OUTPATIENT)
Dept: FAMILY MEDICINE CLINIC | Facility: CLINIC | Age: 28
End: 2023-01-17
Payer: COMMERCIAL

## 2023-01-17 VITALS
OXYGEN SATURATION: 99 % | DIASTOLIC BLOOD PRESSURE: 80 MMHG | HEART RATE: 94 BPM | BODY MASS INDEX: 37.18 KG/M2 | TEMPERATURE: 97.5 F | WEIGHT: 223.4 LBS | SYSTOLIC BLOOD PRESSURE: 125 MMHG

## 2023-01-17 DIAGNOSIS — R07.9 CHEST PAIN, UNSPECIFIED TYPE: Primary | ICD-10-CM

## 2023-01-17 DIAGNOSIS — J45.40 MODERATE PERSISTENT ASTHMA, UNSPECIFIED WHETHER COMPLICATED: ICD-10-CM

## 2023-01-17 DIAGNOSIS — R05.1 ACUTE COUGH: ICD-10-CM

## 2023-01-17 DIAGNOSIS — R42 DIZZINESS: ICD-10-CM

## 2023-01-17 LAB
ALBUMIN SERPL-MCNC: 4.6 G/DL (ref 3.5–5.2)
ALBUMIN/GLOB SERPL: 2.2 G/DL
ALP SERPL-CCNC: 51 U/L (ref 39–117)
ALT SERPL W P-5'-P-CCNC: 20 U/L (ref 1–33)
ANION GAP SERPL CALCULATED.3IONS-SCNC: 5 MMOL/L (ref 5–15)
AST SERPL-CCNC: 21 U/L (ref 1–32)
BASOPHILS # BLD AUTO: 0.04 10*3/MM3 (ref 0–0.2)
BASOPHILS NFR BLD AUTO: 0.7 % (ref 0–1.5)
BILIRUB SERPL-MCNC: 0.3 MG/DL (ref 0–1.2)
BUN SERPL-MCNC: 9 MG/DL (ref 6–20)
BUN/CREAT SERPL: 13.8 (ref 7–25)
CALCIUM SPEC-SCNC: 9.1 MG/DL (ref 8.6–10.5)
CHLORIDE SERPL-SCNC: 107 MMOL/L (ref 98–107)
CO2 SERPL-SCNC: 26 MMOL/L (ref 22–29)
CREAT SERPL-MCNC: 0.65 MG/DL (ref 0.57–1)
D DIMER PPP FEU-MCNC: <0.27 MCGFEU/ML (ref 0–0.5)
DEPRECATED RDW RBC AUTO: 37.5 FL (ref 37–54)
EGFRCR SERPLBLD CKD-EPI 2021: 122.4 ML/MIN/1.73
EOSINOPHIL # BLD AUTO: 0.2 10*3/MM3 (ref 0–0.4)
EOSINOPHIL NFR BLD AUTO: 3.5 % (ref 0.3–6.2)
ERYTHROCYTE [DISTWIDTH] IN BLOOD BY AUTOMATED COUNT: 11.7 % (ref 12.3–15.4)
EXPIRATION DATE: NORMAL
GLOBULIN UR ELPH-MCNC: 2.1 GM/DL
GLUCOSE SERPL-MCNC: 112 MG/DL (ref 65–99)
HCT VFR BLD AUTO: 40.3 % (ref 34–46.6)
HGB BLD-MCNC: 13.8 G/DL (ref 12–15.9)
IMM GRANULOCYTES # BLD AUTO: 0.02 10*3/MM3 (ref 0–0.05)
IMM GRANULOCYTES NFR BLD AUTO: 0.3 % (ref 0–0.5)
INTERNAL CONTROL: NORMAL
LYMPHOCYTES # BLD AUTO: 2.22 10*3/MM3 (ref 0.7–3.1)
LYMPHOCYTES NFR BLD AUTO: 38.6 % (ref 19.6–45.3)
Lab: NORMAL
MAGNESIUM SERPL-MCNC: 2.4 MG/DL (ref 1.6–2.6)
MCH RBC QN AUTO: 30.2 PG (ref 26.6–33)
MCHC RBC AUTO-ENTMCNC: 34.2 G/DL (ref 31.5–35.7)
MCV RBC AUTO: 88.2 FL (ref 79–97)
MONOCYTES # BLD AUTO: 0.52 10*3/MM3 (ref 0.1–0.9)
MONOCYTES NFR BLD AUTO: 9 % (ref 5–12)
NEUTROPHILS NFR BLD AUTO: 2.75 10*3/MM3 (ref 1.7–7)
NEUTROPHILS NFR BLD AUTO: 47.9 % (ref 42.7–76)
NRBC BLD AUTO-RTO: 0 /100 WBC (ref 0–0.2)
PLATELET # BLD AUTO: 237 10*3/MM3 (ref 140–450)
PMV BLD AUTO: 9.3 FL (ref 6–12)
POTASSIUM SERPL-SCNC: 3.8 MMOL/L (ref 3.5–5.2)
PROT SERPL-MCNC: 6.7 G/DL (ref 6–8.5)
RBC # BLD AUTO: 4.57 10*6/MM3 (ref 3.77–5.28)
SARS-COV-2 AG UPPER RESP QL IA.RAPID: NOT DETECTED
SODIUM SERPL-SCNC: 138 MMOL/L (ref 136–145)
T4 FREE SERPL-MCNC: 1.54 NG/DL (ref 0.93–1.7)
TSH SERPL DL<=0.05 MIU/L-ACNC: 1.11 UIU/ML (ref 0.27–4.2)
WBC NRBC COR # BLD: 5.75 10*3/MM3 (ref 3.4–10.8)

## 2023-01-17 PROCEDURE — 85379 FIBRIN DEGRADATION QUANT: CPT | Performed by: FAMILY MEDICINE

## 2023-01-17 PROCEDURE — 84439 ASSAY OF FREE THYROXINE: CPT | Performed by: FAMILY MEDICINE

## 2023-01-17 PROCEDURE — 80053 COMPREHEN METABOLIC PANEL: CPT | Performed by: FAMILY MEDICINE

## 2023-01-17 PROCEDURE — 83735 ASSAY OF MAGNESIUM: CPT | Performed by: FAMILY MEDICINE

## 2023-01-17 PROCEDURE — 93000 ELECTROCARDIOGRAM COMPLETE: CPT | Performed by: FAMILY MEDICINE

## 2023-01-17 PROCEDURE — 84484 ASSAY OF TROPONIN QUANT: CPT | Performed by: FAMILY MEDICINE

## 2023-01-17 PROCEDURE — 84443 ASSAY THYROID STIM HORMONE: CPT | Performed by: FAMILY MEDICINE

## 2023-01-17 PROCEDURE — 85025 COMPLETE CBC W/AUTO DIFF WBC: CPT | Performed by: FAMILY MEDICINE

## 2023-01-17 PROCEDURE — 87426 SARSCOV CORONAVIRUS AG IA: CPT | Performed by: FAMILY MEDICINE

## 2023-01-17 PROCEDURE — 84702 CHORIONIC GONADOTROPIN TEST: CPT | Performed by: FAMILY MEDICINE

## 2023-01-17 PROCEDURE — 99214 OFFICE O/P EST MOD 30 MIN: CPT | Performed by: FAMILY MEDICINE

## 2023-01-17 RX ORDER — PREDNISONE 20 MG/1
40 TABLET ORAL
COMMUNITY
Start: 2023-01-12 | End: 2023-01-17 | Stop reason: SDUPTHER

## 2023-01-17 RX ORDER — FLUTICASONE PROPIONATE AND SALMETEROL XINAFOATE 45; 21 UG/1; UG/1
2 AEROSOL, METERED RESPIRATORY (INHALATION)
Qty: 12 G | Refills: 1 | Status: SHIPPED | OUTPATIENT
Start: 2023-01-17

## 2023-01-17 RX ORDER — PREDNISONE 20 MG/1
60 TABLET ORAL DAILY
Qty: 15 TABLET | Refills: 0 | Status: SHIPPED | OUTPATIENT
Start: 2023-01-17 | End: 2023-01-22

## 2023-01-17 NOTE — PROGRESS NOTES
..  Venipuncture Blood Specimen Collection  Venipuncture performed in left arm by Tegan Farrell with good hemostasis. Patient tolerated the procedure well without complications.   01/17/23   Tegan Farrell

## 2023-01-17 NOTE — PROGRESS NOTES
Chief Complaint  Chest Pain (Follow up from ER for dizziness and chest pain. Info in chart/care everywhere from [facility]. )    Subjective          Charlotte Paredes presents to [facility]  History of Present Illness  Pt had chest pain last Thursday was seen in ER- reviewed her records ER- was diagnosed with asthma exacerbation- pt has still had intermittent chest pain and has had dizziness  Chest Pain   This is a new problem. The current episode started in the past 7 days. The onset quality is sudden. The problem occurs intermittently. The problem has been unchanged. The pain is present in the substernal region. The pain is moderate. The quality of the pain is described as sharp. The pain radiates to the left shoulder. Associated symptoms include a cough and dizziness. Pertinent negatives include no abdominal pain, back pain, claudication, diaphoresis, exertional chest pressure, fever, headaches, hemoptysis, irregular heartbeat, leg pain, lower extremity edema, malaise/fatigue, nausea, near-syncope, numbness, orthopnea, palpitations, PND, shortness of breath, sputum production, syncope, vomiting or weakness. The cough has no precipitants. Nothing worsens the cough. Treatments tried: inhaler, prednisone. The treatment provided no relief.       Objective   Allergies   Allergen Reactions   • Cefaclor Hives   • Tape Rash     Immunization History   Administered Date(s) Administered   • COVID-19 (PFIZER) PURPLE CAP 03/23/2021, 04/20/2021, 10/20/2021, 01/05/2022   • FluLaval/Fluzone >6mos 10/20/2021   • HPV Quadrivalent 08/15/2008   • Influenza, Unspecified 10/20/2021   • Meningococcal MCV4P (Menactra) 08/15/2008   • Pneumococcal Polysaccharide (PPSV23) 09/07/2017   • Tdap 08/15/2008, 03/11/2014     Past Medical History:   Diagnosis Date   • Anxiety    • Asthma     inhalers prn   • Depression    • GERD (gastroesophageal reflux disease)    • Migraines    • Neurocardiogenic syncope     no  episodes in 2yrs, only sees pcp   • Recurrent tonsillitis    • Seasonal allergies       Past Surgical History:   Procedure Laterality Date   • ABDOMINAL SURGERY  2012    ex lap   •  SECTION      x3   • ENDOSCOPY N/A 2022    Procedure: ESOPHAGOGASTRODUODENOSCOPY wtih biopsies;  Surgeon: Sirisha Thomson MD;  Location: [facility] ENDOSCOPY;  Service: Gastroenterology;  Laterality: N/A;  esophagitis and gastritis   • UPPER GASTROINTESTINAL ENDOSCOPY        Social History     Socioeconomic History   • Marital status: Single   Tobacco Use   • Smoking status: Never   • Smokeless tobacco: Never   Vaping Use   • Vaping Use: Never used   Substance and Sexual Activity   • Alcohol use: Not Currently     Comment: rarely   • Drug use: Never   • Sexual activity: Defer        Current Outpatient Medications:   •  esomeprazole (nexIUM) 40 MG capsule, Take 1 capsule by mouth Daily., Disp: 90 capsule, Rfl: 1  •  famotidine (Pepcid) 40 MG tablet, Take 1 tablet by mouth Daily., Disp: 90 tablet, Rfl: 1  •  fexofenadine (ALLEGRA) 180 MG tablet, Take 180 mg by mouth Daily., Disp: , Rfl:   •  fluticasone (FLONASE) 50 MCG/ACT nasal spray, 1-2 sprays by Each Nare route Daily., Disp: , Rfl:   •  levonorgestrel (MIRENA) 20 MCG/24HR IUD, 1 each by Intrauterine route., Disp: , Rfl:   •  predniSONE (DELTASONE) 20 MG tablet, Take 3 tablets by mouth Daily for 5 days., Disp: 15 tablet, Rfl: 0  •  fluticasone-salmeterol (Advair HFA) 45-21 MCG/ACT inhaler, Inhale 2 puffs 2 (Two) Times a Day., Disp: 12 g, Rfl: 1   Family History   Problem Relation Age of Onset   • Anemia Mother    • COPD Mother    • Depression Mother    • Alcohol abuse Mother    • Alcohol abuse Father    • Depression Brother    • Alcohol abuse Brother    • Arthritis Brother    • Diabetes type II Maternal Grandmother    • Hypertension Maternal Grandmother    • Breast cancer Maternal Grandmother    • COPD Maternal Grandfather    • Alcohol abuse Maternal Grandfather    •  Liver cancer Maternal Grandfather    • Hyperlipidemia Paternal Grandfather    • Hypertension Paternal Grandfather           Vital Signs:   Vitals:    01/17/23 1404   BP: 125/80   Pulse: 94   Temp: 97.5 °F (36.4 °C)   SpO2: 99%   Weight: 101 kg (223 lb 6.4 oz)       Review of Systems   Constitutional: Negative for diaphoresis, fever and malaise/fatigue.   HENT: Negative for sore throat.    Eyes: Negative for visual disturbance.   Respiratory: Positive for cough. Negative for hemoptysis, sputum production, chest tightness, shortness of breath and wheezing.    Cardiovascular: Positive for chest pain. Negative for palpitations, orthopnea, claudication, leg swelling, syncope, PND and near-syncope.   Gastrointestinal: Negative for abdominal pain, diarrhea, nausea and vomiting.   Musculoskeletal: Negative for arthralgias and back pain.   Skin: Negative for rash.   Neurological: Positive for dizziness. Negative for weakness, light-headedness, numbness and headaches.      Physical Exam  Vitals reviewed.   Constitutional:       Appearance: Normal appearance. She is well-developed.   HENT:      Head: Normocephalic and atraumatic.      Right Ear: External ear normal.      Left Ear: External ear normal.      Nose: Nose normal.      Mouth/Throat:      Mouth: Mucous membranes are moist.      Pharynx: Oropharynx is clear. No oropharyngeal exudate or posterior oropharyngeal erythema.   Eyes:      Conjunctiva/sclera: Conjunctivae normal.      Pupils: Pupils are equal, round, and reactive to light.   Cardiovascular:      Rate and Rhythm: Normal rate and regular rhythm.      Pulses: Normal pulses.      Heart sounds: Normal heart sounds. No murmur heard.    No friction rub. No gallop.   Pulmonary:      Effort: Pulmonary effort is normal.      Breath sounds: Normal breath sounds. No wheezing or rhonchi.   Abdominal:      General: Abdomen is flat. Bowel sounds are normal. There is no distension.      Palpations: Abdomen is soft. There is  no mass.      Tenderness: There is no abdominal tenderness. There is no guarding or rebound.      Hernia: No hernia is present.   Musculoskeletal:         General: Normal range of motion.      Cervical back: Normal range of motion and neck supple.   Skin:     General: Skin is warm and dry.      Capillary Refill: Capillary refill takes less than 2 seconds.   Neurological:      General: No focal deficit present.      Mental Status: She is alert and oriented to person, place, and time.      Cranial Nerves: No cranial nerve deficit.   Psychiatric:         Mood and Affect: Mood and affect normal.         Behavior: Behavior normal.         Thought Content: Thought content normal.         Judgment: Judgment normal.        Result Review :            ECG 12 Lead    Date/Time: 1/17/2023 3:41 PM  Performed by: Sergio Torres MD  Authorized by: Sergio Torres MD   Comparison: not compared with previous ECG   Previous ECG: no previous ECG available  Rhythm: sinus rhythm  Rate: normal  Conduction: conduction normal  ST Segments: ST segments normal  T Waves: T waves normal  QRS axis: normal  Other: no other findings  Lead: right-sided leads used    Clinical impression: normal ECG              Assessment and Plan    Diagnoses and all orders for this visit:    1. Chest pain, unspecified type (Primary)  -     Ambulatory Referral to Cardiology  -     ECG 12 Lead  -     Troponin T  -     Comprehensive Metabolic Panel  -     Magnesium  -     TSH+Free T4  -     CBC Auto Differential  -     D-dimer, Quantitative    2. Acute cough  -     Comprehensive Metabolic Panel  -     predniSONE (DELTASONE) 20 MG tablet; Take 3 tablets by mouth Daily for 5 days.  Dispense: 15 tablet; Refill: 0  -     fluticasone-salmeterol (Advair HFA) 45-21 MCG/ACT inhaler; Inhale 2 puffs 2 (Two) Times a Day.  Dispense: 12 g; Refill: 1  -     POCT SARS-CoV-2 Antigen [place]    3. Moderate persistent asthma, unspecified whether complicated  -      fluticasone-salmeterol (Advair HFA) 45-21 MCG/ACT inhaler; Inhale 2 puffs 2 (Two) Times a Day.  Dispense: 12 g; Refill: 1    4. Dizziness  -     Ambulatory Referral to Cardiology  -     Comprehensive Metabolic Panel  -     Magnesium  -     TSH+Free T4  -     CBC Auto Differential  -     hCG, Quantitative, Pregnancy            Follow Up   Return in about 1 week (around 1/24/2023) for Recheck.  Patient was given instructions and counseling regarding her condition or for health maintenance advice. Please see specific information pulled into the AVS if appropriate.

## 2023-01-18 LAB — HCG INTACT+B SERPL-ACNC: <1 MIU/ML

## 2023-01-19 LAB — TROPONIN T SERPL HS-MCNC: <6 NG/L (ref 0–14)

## 2023-01-25 ENCOUNTER — OFFICE VISIT (OUTPATIENT)
Dept: CARDIOLOGY | Facility: CLINIC | Age: 28
End: 2023-01-25
Payer: COMMERCIAL

## 2023-01-25 VITALS
BODY MASS INDEX: 37.15 KG/M2 | HEART RATE: 75 BPM | DIASTOLIC BLOOD PRESSURE: 78 MMHG | SYSTOLIC BLOOD PRESSURE: 117 MMHG | WEIGHT: 223 LBS | HEIGHT: 65 IN

## 2023-01-25 DIAGNOSIS — R07.89 ATYPICAL CHEST PAIN: Primary | ICD-10-CM

## 2023-01-25 DIAGNOSIS — R55 NEUROCARDIOGENIC SYNCOPE: ICD-10-CM

## 2023-01-25 PROCEDURE — 99204 OFFICE O/P NEW MOD 45 MIN: CPT | Performed by: INTERNAL MEDICINE

## 2023-01-25 NOTE — PROGRESS NOTES
Chief Complaint  Chest Pain and Dizziness      History of Present Illness  Charlotte Paredes presents to [facility]    This is a very pleasant 27-year-old lady with past medical history as outlined below was referred to clinic for cardiac evaluation.  About 2 weeks ago, she had sudden onset chest discomfort while at work.  It felt like tightness.  It was associated with dizziness and left arm numbness.  It lasted about 2 minutes before it subsided.  She went to the emergency room and was treated with breathing treatment which resolved her chest discomfort.  Chest discomfort was thought to be caused by asthma exacerbation.  She was discharged on Advair along with rescue inhaler.  She has not had recurrent chest discomfort since then.      Patient has history of neurocardiogenic syncope.  She used to be on Florinef which she stopped due to weight gain and other side effects.  She continues to have sporadic dizziness spells but has not had syncope for over 2 years.  She is normally able to abort her dizziness/syncope by sitting down.  She remains well-hydrated and uses compression stockings on occasions.  He has no other complaints today.      Past Medical History:   Diagnosis Date   • Anxiety    • Asthma     inhalers prn   • Depression    • GERD (gastroesophageal reflux disease)    • Migraines    • Neurocardiogenic syncope     no episodes in 2yrs, only sees pcp   • Recurrent tonsillitis    • Seasonal allergies          Current Outpatient Medications:   •  esomeprazole (nexIUM) 40 MG capsule, Take 1 capsule by mouth Daily., Disp: 90 capsule, Rfl: 1  •  famotidine (Pepcid) 40 MG tablet, Take 1 tablet by mouth Daily., Disp: 90 tablet, Rfl: 1  •  fexofenadine (ALLEGRA) 180 MG tablet, Take 180 mg by mouth Daily., Disp: , Rfl:   •  fluticasone (FLONASE) 50 MCG/ACT nasal spray, 1-2 sprays by Each Nare route Daily., Disp: , Rfl:   •  fluticasone-salmeterol (Advair HFA) 45-21 MCG/ACT inhaler, Inhale 2  "puffs 2 (Two) Times a Day., Disp: 12 g, Rfl: 1  •  levonorgestrel (MIRENA) 20 MCG/24HR IUD, 1 each by Intrauterine route., Disp: , Rfl:     There are no discontinued medications.  Allergies   Allergen Reactions   • Cefaclor Hives   • Tape Rash        Social History     Tobacco Use   • Smoking status: Never   • Smokeless tobacco: Never   Vaping Use   • Vaping Use: Never used   Substance Use Topics   • Alcohol use: Not Currently     Comment: rarely   • Drug use: Never       Family History   Problem Relation Age of Onset   • Anemia Mother    • COPD Mother    • Depression Mother    • Alcohol abuse Mother    • Alcohol abuse Father    • Depression Brother    • Alcohol abuse Brother    • Arthritis Brother    • Diabetes type II Maternal Grandmother    • Hypertension Maternal Grandmother    • Breast cancer Maternal Grandmother    • COPD Maternal Grandfather    • Alcohol abuse Maternal Grandfather    • Liver cancer Maternal Grandfather    • Hyperlipidemia Paternal Grandfather    • Hypertension Paternal Grandfather         Objective     /78   Pulse 75   Ht 165.1 cm (65\")   Wt 101 kg (223 lb)   BMI 37.11 kg/m²       Physical Exam  Constitutional:       General: Awake. Not in acute distress.     Appearance: Normal appearance.   Neck:      Vascular: No carotid bruit, hepatojugular reflux or JVD.   Cardiovascular:      Rate and Rhythm: Normal rate and regular rhythm.      Chest Wall: PMI is not displaced.      Heart sounds: Normal heart sounds, S1 normal and S2 normal. No murmur heard.   No friction rub. No gallop. No S3 or S4 sounds.    Pulmonary:      Effort: Pulmonary effort is normal.      Breath sounds: Normal breath sounds. No wheezing, rhonchi or rales.   Ext.      Right lower leg: No edema.      Left lower leg: No edema.   Skin:     General: Skin is warm and dry.      Coloration: Skin is not cyanotic.      Findings: No petechiae or rash.   Neurological:      Mental Status: Alert and oriented x 3  Psychiatric:    "      Behavior: Behavior is cooperative.       Result Review :     No results found for: PROBNP  CMP    CMP 1/17/23   Glucose 112 (A)   BUN 9   Creatinine 0.65   eGFR 122.4   Sodium 138   Potassium 3.8   Chloride 107   Calcium 9.1   Total Protein 6.7   Albumin 4.6   Globulin 2.1   Total Bilirubin 0.3   Alkaline Phosphatase 51   AST (SGOT) 21   ALT (SGPT) 20   Albumin/Globulin Ratio 2.2   BUN/Creatinine Ratio 13.8   Anion Gap 5.0   (A) Abnormal value            CBC w/diff    CBC w/Diff 7/6/22 1/17/23   WBC 6.74 5.75   RBC 5.20 4.57   Hemoglobin 15.5 13.8   Hematocrit 46.0 40.3   MCV 88.5 88.2   MCH 29.8 30.2   MCHC 33.7 34.2   RDW 12.1 11.7 (A)   Platelets 266 237   Neutrophil Rel % 87.5 (A) 47.9   Immature Granulocyte Rel % 0.6 0.3   Lymphocyte Rel % 9.5 (A) 38.6   Monocyte Rel % 1.8 9.0   Eosinophil Rel % 0.3 3.5   Basophil Rel % 0.3 0.7   (A) Abnormal value             Lab Results   Component Value Date    TSH 1.110 01/17/2023      Lab Results   Component Value Date    FREET4 1.54 01/17/2023      D-Dimer, Quantitative   Date Value Ref Range Status   01/17/2023 <0.27 0.00 - 0.50 MCGFEU/mL Final     Magnesium   Date Value Ref Range Status   01/17/2023 2.4 1.6 - 2.6 mg/dL Final      No results found for: DIGOXIN   Lab Results   Component Value Date    TROPONINT <6 01/17/2023      No results found for: POCTROP(                   No results found for this or any previous visit.                Diagnoses and all orders for this visit:    1. Atypical chest pain (Primary)    2. Neurocardiogenic syncope      Assessment:    -Atypical chest pain: Appears to be noncardiac in origin.  More likely is related to underlying reactive airway disease.  Her exam today is benign.  Her ECG shows sinus rhythm without ischemic ST-T changes.  She has been doing well without recurrent chest discomfort since she was started on Advair/rescue inhaler.  Patient was reassured.  Continue to monitor clinically.    -Neurocardiogenic syncope: She  used to be on Florinef which she stopped due to weight gain and other side effects.  She has not had syncopal episodes in over 2 years now.  However, she has occasional dizziness spells which she is able to resolve by sitting down.  Adequate hydration, salt intake and compression stockings were recommended.  In addition, lower extremity and core muscle exercises were recommended.    Follow Up       No follow-ups on file.    Patient was given instructions and counseling regarding her condition or for health maintenance advice. Please see specific information pulled into the AVS if appropriate.

## 2023-02-08 ENCOUNTER — TELEPHONE (OUTPATIENT)
Dept: GASTROENTEROLOGY | Facility: CLINIC | Age: 28
End: 2023-02-08
Payer: COMMERCIAL

## 2023-02-21 ENCOUNTER — OFFICE VISIT (OUTPATIENT)
Dept: FAMILY MEDICINE CLINIC | Facility: CLINIC | Age: 28
End: 2023-02-21
Payer: COMMERCIAL

## 2023-02-21 VITALS
WEIGHT: 218 LBS | OXYGEN SATURATION: 98 % | HEART RATE: 80 BPM | SYSTOLIC BLOOD PRESSURE: 118 MMHG | TEMPERATURE: 97.3 F | BODY MASS INDEX: 36.28 KG/M2 | DIASTOLIC BLOOD PRESSURE: 70 MMHG

## 2023-02-21 DIAGNOSIS — J02.9 PHARYNGITIS, UNSPECIFIED ETIOLOGY: Primary | ICD-10-CM

## 2023-02-21 LAB
EXPIRATION DATE: NORMAL
INTERNAL CONTROL: NORMAL
Lab: NORMAL
S PYO AG THROAT QL: NEGATIVE

## 2023-02-21 PROCEDURE — 87880 STREP A ASSAY W/OPTIC: CPT | Performed by: FAMILY MEDICINE

## 2023-02-21 PROCEDURE — 99213 OFFICE O/P EST LOW 20 MIN: CPT | Performed by: FAMILY MEDICINE

## 2023-02-21 RX ORDER — TIZANIDINE 2 MG/1
TABLET ORAL
COMMUNITY
Start: 2023-02-18 | End: 2023-03-16

## 2023-02-21 RX ORDER — METHYLPREDNISOLONE 4 MG/1
TABLET ORAL
Qty: 21 TABLET | Refills: 0 | Status: ON HOLD | OUTPATIENT
Start: 2023-02-21 | End: 2023-02-22

## 2023-02-21 RX ORDER — CLINDAMYCIN HYDROCHLORIDE 300 MG/1
300 CAPSULE ORAL 4 TIMES DAILY
Qty: 28 CAPSULE | Refills: 0 | Status: SHIPPED | OUTPATIENT
Start: 2023-02-21 | End: 2023-02-28

## 2023-02-21 RX ORDER — SACCHAROMYCES BOULARDII 250 MG
250 CAPSULE ORAL 2 TIMES DAILY
Qty: 20 CAPSULE | Refills: 0 | Status: ON HOLD | OUTPATIENT
Start: 2023-02-21 | End: 2023-02-22

## 2023-02-21 NOTE — PROGRESS NOTES
Chief Complaint  Sore Throat (Sore throat and congestion x four days. )    Subjective          Charlotte Paredes presents to [facility]  Sore Throat   This is a new problem. Episode onset: 3 days. The problem has been unchanged. There has been no fever. Associated symptoms include coughing. Pertinent negatives include no abdominal pain, congestion, diarrhea, drooling, ear discharge, ear pain, headaches, hoarse voice, plugged ear sensation, neck pain, shortness of breath, stridor, swollen glands, trouble swallowing or vomiting. She has tried nothing for the symptoms.       Objective   Allergies   Allergen Reactions   • Cefaclor Hives   • Tape Rash     Immunization History   Administered Date(s) Administered   • COVID-19 (PFIZER) PURPLE CAP 2021, 2021, 10/20/2021, 2022   • FluLaval/Fluzone >6mos 10/20/2021   • HPV Quadrivalent 08/15/2008   • Influenza, Unspecified 10/20/2021   • Meningococcal MCV4P (Menactra) 08/15/2008   • Pneumococcal Polysaccharide (PPSV23) 2017   • Tdap 08/15/2008, 2014     Past Medical History:   Diagnosis Date   • Anxiety    • Asthma     inhalers prn   • Depression    • GERD (gastroesophageal reflux disease)    • Migraines    • Neurocardiogenic syncope     no episodes in 2yrs, only sees pcp   • Recurrent tonsillitis    • Seasonal allergies       Past Surgical History:   Procedure Laterality Date   • ABDOMINAL SURGERY      ex lap   •  SECTION      x3   • ENDOSCOPY N/A 2022    Procedure: ESOPHAGOGASTRODUODENOSCOPY wtih biopsies;  Surgeon: Sirisha Thomson MD;  Location: [facility] ENDOSCOPY;  Service: Gastroenterology;  Laterality: N/A;  esophagitis and gastritis   • UPPER GASTROINTESTINAL ENDOSCOPY        Social History     Socioeconomic History   • Marital status: Single   Tobacco Use   • Smoking status: Never   • Smokeless tobacco: Never   Vaping Use   • Vaping Use: Never used   Substance and Sexual Activity   •  Alcohol use: Not Currently     Comment: rarely   • Drug use: Never   • Sexual activity: Defer        Current Outpatient Medications:   •  esomeprazole (nexIUM) 40 MG capsule, Take 1 capsule by mouth Daily., Disp: 90 capsule, Rfl: 1  •  famotidine (Pepcid) 40 MG tablet, Take 1 tablet by mouth Daily., Disp: 90 tablet, Rfl: 1  •  fexofenadine (ALLEGRA) 180 MG tablet, Take 180 mg by mouth Daily., Disp: , Rfl:   •  fluticasone (FLONASE) 50 MCG/ACT nasal spray, 1-2 sprays by Each Nare route Daily., Disp: , Rfl:   •  fluticasone-salmeterol (Advair HFA) 45-21 MCG/ACT inhaler, Inhale 2 puffs 2 (Two) Times a Day., Disp: 12 g, Rfl: 1  •  levonorgestrel (MIRENA) 20 MCG/24HR IUD, 1 each by Intrauterine route., Disp: , Rfl:   •  tiZANidine (ZANAFLEX) 2 MG tablet, , Disp: , Rfl:   •  clindamycin (Cleocin) 300 MG capsule, Take 1 capsule by mouth 4 (Four) Times a Day for 7 days., Disp: 28 capsule, Rfl: 0  •  methylPREDNISolone (MEDROL) 4 MG dose pack, Take as directed on package instructions., Disp: 21 tablet, Rfl: 0  •  saccharomyces boulardii (Florastor) 250 MG capsule, Take 1 capsule by mouth 2 (Two) Times a Day for 10 days., Disp: 20 capsule, Rfl: 0   Family History   Problem Relation Age of Onset   • Anemia Mother    • COPD Mother    • Depression Mother    • Alcohol abuse Mother    • Alcohol abuse Father    • Depression Brother    • Alcohol abuse Brother    • Arthritis Brother    • Esophageal cancer Maternal Aunt    • Diabetes type II Maternal Grandmother    • Hypertension Maternal Grandmother    • Breast cancer Maternal Grandmother    • COPD Maternal Grandfather    • Alcohol abuse Maternal Grandfather    • Liver cancer Maternal Grandfather    • Hyperlipidemia Paternal Grandfather    • Hypertension Paternal Grandfather           Vital Signs:   Vitals:    02/21/23 1120   BP: 118/70   Pulse: 80   Temp: 97.3 °F (36.3 °C)   SpO2: 98%   Weight: 98.9 kg (218 lb)       Review of Systems   HENT: Positive for sore throat. Negative for  congestion, drooling, ear discharge, ear pain, hoarse voice and trouble swallowing.    Respiratory: Positive for cough. Negative for shortness of breath and stridor.    Gastrointestinal: Negative for abdominal pain, diarrhea and vomiting.   Musculoskeletal: Negative for neck pain.   Neurological: Negative for headaches.      Physical Exam  Vitals reviewed.   Constitutional:       Appearance: Normal appearance. She is well-developed.   HENT:      Head: Normocephalic and atraumatic.      Right Ear: Tympanic membrane, ear canal and external ear normal.      Left Ear: Tympanic membrane, ear canal and external ear normal.      Nose: Nose normal.      Mouth/Throat:      Mouth: Mucous membranes are moist.      Pharynx: Oropharynx is clear. Posterior oropharyngeal erythema present. No oropharyngeal exudate.   Eyes:      Conjunctiva/sclera: Conjunctivae normal.      Pupils: Pupils are equal, round, and reactive to light.   Cardiovascular:      Rate and Rhythm: Normal rate and regular rhythm.      Pulses: Normal pulses.      Heart sounds: Normal heart sounds. No murmur heard.    No friction rub. No gallop.   Pulmonary:      Effort: Pulmonary effort is normal.      Breath sounds: Normal breath sounds. No wheezing or rhonchi.   Abdominal:      General: Abdomen is flat. Bowel sounds are normal. There is no distension.      Palpations: Abdomen is soft. There is no mass.      Tenderness: There is no abdominal tenderness. There is no guarding or rebound.      Hernia: No hernia is present.   Musculoskeletal:         General: Normal range of motion.      Cervical back: Normal range of motion and neck supple.   Skin:     General: Skin is warm and dry.      Capillary Refill: Capillary refill takes less than 2 seconds.   Neurological:      General: No focal deficit present.      Mental Status: She is alert and oriented to person, place, and time.      Cranial Nerves: No cranial nerve deficit.   Psychiatric:         Mood and Affect: Mood  and affect normal.         Behavior: Behavior normal.         Thought Content: Thought content normal.         Judgment: Judgment normal.        Result Review :                 Assessment and Plan    Diagnoses and all orders for this visit:    1. Pharyngitis, unspecified etiology (Primary)  -     POCT rapid strep A  -     clindamycin (Cleocin) 300 MG capsule; Take 1 capsule by mouth 4 (Four) Times a Day for 7 days.  Dispense: 28 capsule; Refill: 0  -     saccharomyces boulardii (Florastor) 250 MG capsule; Take 1 capsule by mouth 2 (Two) Times a Day for 10 days.  Dispense: 20 capsule; Refill: 0  -     methylPREDNISolone (MEDROL) 4 MG dose pack; Take as directed on package instructions.  Dispense: 21 tablet; Refill: 0            Follow Up   Return if symptoms worsen or fail to improve.  Patient was given instructions and counseling regarding her condition or for health maintenance advice. Please see specific information pulled into the AVS if appropriate.

## 2023-02-22 ENCOUNTER — ANESTHESIA (OUTPATIENT)
Dept: GASTROENTEROLOGY | Facility: HOSPITAL | Age: 28
End: 2023-02-22
Payer: COMMERCIAL

## 2023-02-22 ENCOUNTER — ANESTHESIA EVENT (OUTPATIENT)
Dept: GASTROENTEROLOGY | Facility: HOSPITAL | Age: 28
End: 2023-02-22
Payer: COMMERCIAL

## 2023-02-22 ENCOUNTER — HOSPITAL ENCOUNTER (OUTPATIENT)
Facility: HOSPITAL | Age: 28
Setting detail: HOSPITAL OUTPATIENT SURGERY
Discharge: HOME | End: 2023-02-22
Attending: INTERNAL MEDICINE | Admitting: INTERNAL MEDICINE
Payer: COMMERCIAL

## 2023-02-22 VITALS
HEIGHT: 65 IN | WEIGHT: 216.49 LBS | OXYGEN SATURATION: 98 % | TEMPERATURE: 97.8 F | BODY MASS INDEX: 36.07 KG/M2 | HEART RATE: 81 BPM | RESPIRATION RATE: 24 BRPM | DIASTOLIC BLOOD PRESSURE: 77 MMHG | SYSTOLIC BLOOD PRESSURE: 116 MMHG

## 2023-02-22 DIAGNOSIS — K22.70 BARRETT'S ESOPHAGUS WITHOUT DYSPLASIA: ICD-10-CM

## 2023-02-22 LAB — B-HCG UR QL: NEGATIVE

## 2023-02-22 PROCEDURE — 88305 TISSUE EXAM BY PATHOLOGIST: CPT | Performed by: INTERNAL MEDICINE

## 2023-02-22 PROCEDURE — 43239 EGD BIOPSY SINGLE/MULTIPLE: CPT | Performed by: INTERNAL MEDICINE

## 2023-02-22 PROCEDURE — 81025 URINE PREGNANCY TEST: CPT | Performed by: ANESTHESIOLOGY

## 2023-02-22 PROCEDURE — 25010000002 PROPOFOL 10 MG/ML EMULSION: Performed by: NURSE ANESTHETIST, CERTIFIED REGISTERED

## 2023-02-22 PROCEDURE — 88342 IMHCHEM/IMCYTCHM 1ST ANTB: CPT | Performed by: INTERNAL MEDICINE

## 2023-02-22 RX ORDER — SODIUM CHLORIDE, SODIUM LACTATE, POTASSIUM CHLORIDE, CALCIUM CHLORIDE 600; 310; 30; 20 MG/100ML; MG/100ML; MG/100ML; MG/100ML
30 INJECTION, SOLUTION INTRAVENOUS CONTINUOUS
Status: DISCONTINUED | OUTPATIENT
Start: 2023-02-22 | End: 2023-02-22 | Stop reason: HOSPADM

## 2023-02-22 RX ORDER — LIDOCAINE HYDROCHLORIDE 20 MG/ML
INJECTION, SOLUTION EPIDURAL; INFILTRATION; INTRACAUDAL; PERINEURAL AS NEEDED
Status: DISCONTINUED | OUTPATIENT
Start: 2023-02-22 | End: 2023-02-22 | Stop reason: SURG

## 2023-02-22 RX ORDER — PROPOFOL 10 MG/ML
VIAL (ML) INTRAVENOUS AS NEEDED
Status: DISCONTINUED | OUTPATIENT
Start: 2023-02-22 | End: 2023-02-22 | Stop reason: SURG

## 2023-02-22 RX ORDER — SODIUM CHLORIDE, SODIUM LACTATE, POTASSIUM CHLORIDE, CALCIUM CHLORIDE 600; 310; 30; 20 MG/100ML; MG/100ML; MG/100ML; MG/100ML
1000 INJECTION, SOLUTION INTRAVENOUS CONTINUOUS
Status: DISCONTINUED | OUTPATIENT
Start: 2023-02-22 | End: 2023-02-22 | Stop reason: HOSPADM

## 2023-02-22 RX ADMIN — LIDOCAINE HYDROCHLORIDE 100 MG: 20 INJECTION, SOLUTION EPIDURAL; INFILTRATION; INTRACAUDAL; PERINEURAL at 07:32

## 2023-02-22 RX ADMIN — PROPOFOL 50 MG: 10 INJECTION, EMULSION INTRAVENOUS at 07:33

## 2023-02-22 RX ADMIN — PROPOFOL 50 MG: 10 INJECTION, EMULSION INTRAVENOUS at 07:35

## 2023-02-22 RX ADMIN — SODIUM CHLORIDE, POTASSIUM CHLORIDE, SODIUM LACTATE AND CALCIUM CHLORIDE 1000 ML: 600; 310; 30; 20 INJECTION, SOLUTION INTRAVENOUS at 06:49

## 2023-02-22 RX ADMIN — PROPOFOL 150 MG: 10 INJECTION, EMULSION INTRAVENOUS at 07:31

## 2023-02-22 NOTE — ANESTHESIA PREPROCEDURE EVALUATION
Anesthesia Evaluation     Patient summary reviewed and Nursing notes reviewed   no history of anesthetic complications:  NPO Solid Status: > 8 hours  NPO Liquid Status: > 2 hours           Airway   Mallampati: I  TM distance: >3 FB  Neck ROM: full  No difficulty expected  Dental      Pulmonary - normal exam    breath sounds clear to auscultation  (+) asthma,  Cardiovascular - negative cardio ROS and normal exam  Exercise tolerance: good (4-7 METS)    Rhythm: regular  Rate: normal        Neuro/Psych  (+) headaches, syncope, psychiatric history,    GI/Hepatic/Renal/Endo    (+)  GERD,      Musculoskeletal (-) negative ROS    Abdominal    Substance History - negative use     OB/GYN negative ob/gyn ROS         Other - negative ROS       ROS/Med Hx Other: PAT Nursing Notes unavailable.                   Anesthesia Plan    ASA 2     general     (Total IV Anesthesia    Patient understands anesthesia not responsible for dental damage.  )  intravenous induction     Anesthetic plan, risks, benefits, and alternatives have been provided, discussed and informed consent has been obtained with: patient.    Plan discussed with CRNA.        CODE STATUS:

## 2023-02-22 NOTE — ANESTHESIA POSTPROCEDURE EVALUATION
Patient: Charlotte Paredes    Procedure Summary     Date: 02/22/23 Room / Location: [facility] ENDOSCOPY 4 / [facility] ENDOSCOPY    Anesthesia Start: 0728 Anesthesia Stop: 0745    Procedure: ESOPHAGOGASTRODUODENOSCOPY WITH BIOPSIES Diagnosis:       Obrien's esophagus without dysplasia      (Obrien's esophagus without dysplasia [K22.70])    Surgeons: Sirisha Thomson MD Provider: Jaskaran Rodriguez MD    Anesthesia Type: general ASA Status: 2          Anesthesia Type: general    Vitals  Vitals Value Taken Time   /77 02/22/23 0800   Temp 36.6 °C (97.8 °F) 02/22/23 0800   Pulse 81 02/22/23 0800   Resp 24 02/22/23 0800   SpO2 98 % 02/22/23 0800           Post Anesthesia Care and Evaluation    Patient location during evaluation: bedside  Patient participation: complete - patient participated  Level of consciousness: awake  Pain management: adequate    Airway patency: patent  Anesthetic complications: No anesthetic complications  PONV Status: none  Cardiovascular status: acceptable and stable  Respiratory status: acceptable  Hydration status: acceptable    Comments: An Anesthesiologist personally participated in the most demanding procedures (including induction and emergence if applicable) in the anesthesia plan, monitored the course of anesthesia administration at frequent intervals and remained physically present and available for immediate diagnosis and treatment of emergencies.

## 2023-02-22 NOTE — H&P
Pre Procedure History & Physical    Chief Complaint:   F/u of Obrien's esophagus    Subjective     HPI:   26 yo F here for f/u of Obrien's esophagus.    Past Medical History:   Past Medical History:   Diagnosis Date   • Anxiety    • Asthma     inhalers prn   • Depression    • GERD (gastroesophageal reflux disease)    • Migraines    • Neurocardiogenic syncope     no episodes in 2yrs, only sees pcp   • Recurrent tonsillitis    • Seasonal allergies        Past Surgical History:  Past Surgical History:   Procedure Laterality Date   • ABDOMINAL SURGERY      ex lap   •  SECTION      x3   • ENDOSCOPY N/A 2022    Procedure: ESOPHAGOGASTRODUODENOSCOPY wtih biopsies;  Surgeon: Sirisha Thomson MD;  Location: [facility] ENDOSCOPY;  Service: Gastroenterology;  Laterality: N/A;  esophagitis and gastritis   • UPPER GASTROINTESTINAL ENDOSCOPY         Family History:  Family History   Problem Relation Age of Onset   • Anemia Mother    • COPD Mother    • Depression Mother    • Alcohol abuse Mother    • Alcohol abuse Father    • Depression Brother    • Alcohol abuse Brother    • Arthritis Brother    • Esophageal cancer Maternal Aunt    • Diabetes type II Maternal Grandmother    • Hypertension Maternal Grandmother    • Breast cancer Maternal Grandmother    • COPD Maternal Grandfather    • Alcohol abuse Maternal Grandfather    • Liver cancer Maternal Grandfather    • Hyperlipidemia Paternal Grandfather    • Hypertension Paternal Grandfather        Social History:   reports that she has never smoked. She has never used smokeless tobacco. She reports that she does not currently use alcohol. She reports that she does not use drugs.    Medications:   Medications Prior to Admission   Medication Sig Dispense Refill Last Dose   • clindamycin (Cleocin) 300 MG capsule Take 1 capsule by mouth 4 (Four) Times a Day for 7 days. 28 capsule 0 2023   • esomeprazole (nexIUM) 40 MG capsule Take 1 capsule by mouth Daily. 90  "capsule 1 2/21/2023   • famotidine (Pepcid) 40 MG tablet Take 1 tablet by mouth Daily. 90 tablet 1 2/21/2023   • fexofenadine (ALLEGRA) 180 MG tablet Take 180 mg by mouth Daily.   2/21/2023   • fluticasone (FLONASE) 50 MCG/ACT nasal spray 1-2 sprays by Each Nare route Daily.   2/21/2023   • fluticasone-salmeterol (Advair HFA) 45-21 MCG/ACT inhaler Inhale 2 puffs 2 (Two) Times a Day. 12 g 1 Past Week   • levonorgestrel (MIRENA) 20 MCG/24HR IUD 1 each by Intrauterine route.   2/21/2023   • tiZANidine (ZANAFLEX) 2 MG tablet           Allergies:  Cefaclor and Tape    ROS:    Pertinent items are noted in HPI     Objective     Blood pressure 100/59, pulse 70, temperature 97.8 °F (36.6 °C), temperature source Temporal, resp. rate 16, height 165.1 cm (65\"), weight 98.2 kg (216 lb 7.9 oz), SpO2 97 %.    Physical Exam   Constitutional: Pt is oriented to person, place, and time and well-developed, well-nourished, and in no distress.   Mouth/Throat: Oropharynx is clear and moist.   Neck: Normal range of motion.   Cardiovascular: Normal rate, regular rhythm and normal heart sounds.    Pulmonary/Chest: Effort normal and breath sounds normal.   Abdominal: Soft. Nontender  Skin: Skin is warm and dry.   Psychiatric: Mood, memory, affect and judgment normal.     Assessment & Plan     Diagnosis:  F/u of Obrien's esophagus    Anticipated Surgical Procedure:  EGD    The risks, benefits, and alternatives of this procedure have been discussed with the patient or the responsible party- the patient understands and agrees to proceed.          "

## 2023-02-23 LAB
CYTO UR: NORMAL
LAB AP CASE REPORT: NORMAL
LAB AP CLINICAL INFORMATION: NORMAL
LAB AP SPECIAL STAINS: NORMAL
PATH REPORT.FINAL DX SPEC: NORMAL
PATH REPORT.GROSS SPEC: NORMAL

## 2023-02-28 ENCOUNTER — OFFICE VISIT (OUTPATIENT)
Dept: FAMILY MEDICINE CLINIC | Facility: CLINIC | Age: 28
End: 2023-02-28
Payer: COMMERCIAL

## 2023-02-28 VITALS
HEART RATE: 108 BPM | TEMPERATURE: 97.3 F | WEIGHT: 217 LBS | BODY MASS INDEX: 36.11 KG/M2 | OXYGEN SATURATION: 98 % | SYSTOLIC BLOOD PRESSURE: 116 MMHG | DIASTOLIC BLOOD PRESSURE: 78 MMHG

## 2023-02-28 DIAGNOSIS — L98.8 SKIN LESION OF BREAST: ICD-10-CM

## 2023-02-28 DIAGNOSIS — L98.9 SKIN LESION OF HAND: ICD-10-CM

## 2023-02-28 DIAGNOSIS — N63.24 MASS OF LOWER INNER QUADRANT OF LEFT BREAST: ICD-10-CM

## 2023-02-28 DIAGNOSIS — J02.9 PHARYNGITIS, UNSPECIFIED ETIOLOGY: ICD-10-CM

## 2023-02-28 DIAGNOSIS — H61.91 SKIN LESION OF RIGHT EAR: ICD-10-CM

## 2023-02-28 DIAGNOSIS — R10.13 EPIGASTRIC PAIN: Primary | ICD-10-CM

## 2023-02-28 LAB
ALBUMIN SERPL-MCNC: 4.2 G/DL (ref 3.5–5.2)
ALBUMIN/GLOB SERPL: 1.7 G/DL
ALP SERPL-CCNC: 52 U/L (ref 39–117)
ALT SERPL W P-5'-P-CCNC: 18 U/L (ref 1–33)
ANION GAP SERPL CALCULATED.3IONS-SCNC: 7.7 MMOL/L (ref 5–15)
AST SERPL-CCNC: 21 U/L (ref 1–32)
BASOPHILS # BLD AUTO: 0.04 10*3/MM3 (ref 0–0.2)
BASOPHILS NFR BLD AUTO: 0.7 % (ref 0–1.5)
BILIRUB SERPL-MCNC: 0.5 MG/DL (ref 0–1.2)
BUN SERPL-MCNC: 10 MG/DL (ref 6–20)
BUN/CREAT SERPL: 12.7 (ref 7–25)
CALCIUM SPEC-SCNC: 9.8 MG/DL (ref 8.6–10.5)
CHLORIDE SERPL-SCNC: 104 MMOL/L (ref 98–107)
CO2 SERPL-SCNC: 26.3 MMOL/L (ref 22–29)
CREAT SERPL-MCNC: 0.79 MG/DL (ref 0.57–1)
DEPRECATED RDW RBC AUTO: 38 FL (ref 37–54)
EGFRCR SERPLBLD CKD-EPI 2021: 104 ML/MIN/1.73
EOSINOPHIL # BLD AUTO: 0.3 10*3/MM3 (ref 0–0.4)
EOSINOPHIL NFR BLD AUTO: 5.6 % (ref 0.3–6.2)
ERYTHROCYTE [DISTWIDTH] IN BLOOD BY AUTOMATED COUNT: 11.8 % (ref 12.3–15.4)
EXPIRATION DATE: NORMAL
GLOBULIN UR ELPH-MCNC: 2.5 GM/DL
GLUCOSE SERPL-MCNC: 92 MG/DL (ref 65–99)
HCT VFR BLD AUTO: 42.5 % (ref 34–46.6)
HGB BLD-MCNC: 14.6 G/DL (ref 12–15.9)
IMM GRANULOCYTES # BLD AUTO: 0.01 10*3/MM3 (ref 0–0.05)
IMM GRANULOCYTES NFR BLD AUTO: 0.2 % (ref 0–0.5)
INTERNAL CONTROL: NORMAL
LYMPHOCYTES # BLD AUTO: 1.69 10*3/MM3 (ref 0.7–3.1)
LYMPHOCYTES NFR BLD AUTO: 31.5 % (ref 19.6–45.3)
Lab: NORMAL
MCH RBC QN AUTO: 30 PG (ref 26.6–33)
MCHC RBC AUTO-ENTMCNC: 34.4 G/DL (ref 31.5–35.7)
MCV RBC AUTO: 87.4 FL (ref 79–97)
MONOCYTES # BLD AUTO: 0.53 10*3/MM3 (ref 0.1–0.9)
MONOCYTES NFR BLD AUTO: 9.9 % (ref 5–12)
NEUTROPHILS NFR BLD AUTO: 2.79 10*3/MM3 (ref 1.7–7)
NEUTROPHILS NFR BLD AUTO: 52.1 % (ref 42.7–76)
NRBC BLD AUTO-RTO: 0 /100 WBC (ref 0–0.2)
PLATELET # BLD AUTO: 247 10*3/MM3 (ref 140–450)
PMV BLD AUTO: 9 FL (ref 6–12)
POTASSIUM SERPL-SCNC: 4.5 MMOL/L (ref 3.5–5.2)
PROT SERPL-MCNC: 6.7 G/DL (ref 6–8.5)
RBC # BLD AUTO: 4.86 10*6/MM3 (ref 3.77–5.28)
S PYO AG THROAT QL: NEGATIVE
SODIUM SERPL-SCNC: 138 MMOL/L (ref 136–145)
WBC NRBC COR # BLD: 5.36 10*3/MM3 (ref 3.4–10.8)

## 2023-02-28 PROCEDURE — 87880 STREP A ASSAY W/OPTIC: CPT | Performed by: FAMILY MEDICINE

## 2023-02-28 PROCEDURE — 80053 COMPREHEN METABOLIC PANEL: CPT | Performed by: FAMILY MEDICINE

## 2023-02-28 PROCEDURE — 85025 COMPLETE CBC W/AUTO DIFF WBC: CPT | Performed by: FAMILY MEDICINE

## 2023-02-28 PROCEDURE — 99214 OFFICE O/P EST MOD 30 MIN: CPT | Performed by: FAMILY MEDICINE

## 2023-03-01 ENCOUNTER — TELEPHONE (OUTPATIENT)
Dept: GASTROENTEROLOGY | Facility: CLINIC | Age: 28
End: 2023-03-01
Payer: COMMERCIAL

## 2023-03-01 NOTE — TELEPHONE ENCOUNTER
Patient was notified of results. Recall placed and follow up was scheduled. Patient verbalized understanding.

## 2023-03-01 NOTE — TELEPHONE ENCOUNTER
----- Message from OCTAVIO Cabezas sent at 2/24/2023 12:17 PM EST -----  Please let pt. Know that biopsies are okay.  Recommend repeat EGD in 3 years for surveillance of hyatt's.  Please place in recall and schedule for follow-up with Alla.

## 2023-03-03 ENCOUNTER — HOSPITAL ENCOUNTER (OUTPATIENT)
Dept: CT IMAGING | Facility: HOSPITAL | Age: 28
Discharge: HOME | End: 2023-03-03
Admitting: FAMILY MEDICINE
Payer: COMMERCIAL

## 2023-03-03 DIAGNOSIS — R10.13 EPIGASTRIC PAIN: ICD-10-CM

## 2023-03-03 PROCEDURE — 74177 CT ABD & PELVIS W/CONTRAST: CPT

## 2023-03-03 PROCEDURE — 0 IOHEXOL 300 MG/ML SOLUTION: Performed by: FAMILY MEDICINE

## 2023-03-03 RX ADMIN — IOHEXOL 100 ML: 300 INJECTION, SOLUTION INTRAVENOUS at 13:41

## 2023-03-16 ENCOUNTER — OFFICE VISIT (OUTPATIENT)
Dept: FAMILY MEDICINE CLINIC | Facility: CLINIC | Age: 28
End: 2023-03-16
Payer: COMMERCIAL

## 2023-03-16 VITALS
BODY MASS INDEX: 36.11 KG/M2 | TEMPERATURE: 97.7 F | SYSTOLIC BLOOD PRESSURE: 104 MMHG | WEIGHT: 217 LBS | DIASTOLIC BLOOD PRESSURE: 78 MMHG | HEART RATE: 90 BPM | OXYGEN SATURATION: 99 %

## 2023-03-16 DIAGNOSIS — M54.6 CHRONIC LEFT-SIDED THORACIC BACK PAIN: Primary | ICD-10-CM

## 2023-03-16 DIAGNOSIS — G89.29 CHRONIC LEFT-SIDED THORACIC BACK PAIN: Primary | ICD-10-CM

## 2023-03-16 PROCEDURE — 1159F MED LIST DOCD IN RCRD: CPT | Performed by: FAMILY MEDICINE

## 2023-03-16 PROCEDURE — 99213 OFFICE O/P EST LOW 20 MIN: CPT | Performed by: FAMILY MEDICINE

## 2023-03-16 PROCEDURE — 1160F RVW MEDS BY RX/DR IN RCRD: CPT | Performed by: FAMILY MEDICINE

## 2023-03-16 RX ORDER — LIDOCAINE 50 MG/G
1 PATCH TOPICAL EVERY 24 HOURS
Qty: 30 PATCH | Refills: 1 | Status: SHIPPED | OUTPATIENT
Start: 2023-03-16

## 2023-03-16 RX ORDER — IPRATROPIUM BROMIDE AND ALBUTEROL SULFATE 2.5; .5 MG/3ML; MG/3ML
SOLUTION RESPIRATORY (INHALATION)
COMMUNITY
Start: 2023-03-01

## 2023-03-16 NOTE — PROGRESS NOTES
Chief Complaint  Abdominal Pain (Follow-up on abdominal pain )    Subjective          Charlotte Paredes presents to [facility]  History of Present Illness  Pt still has midepigastric abdominal pain since February- pt has had normal labs and CT abdomen/pelvis- pt is on meds that only bring small relief- I contacted Dr. Thomson's office and they are going to see her soon for this    Pt has had chronic mid-back pain that radiates to LUQ abdomen since - pt has had CT abdomen, egd, and has seen GI, general surgery and is now in pain management and seeing physical therapy.  Physical therapy and pain management think that pain may be coming from mid-back- will get x-ray and MRI of thoracic spine- pt has not had any back surgeries previously.  Pt has had over 6 weeks of conservative treatment with meds and physical therapy and relief still has not come    Pt has not been able to tolerate muscle relaxers  Pt unable to take NSAIDS due to Obrien's esophagus   Pt gets trunk and left arm intermittent numbness      Objective   Allergies   Allergen Reactions   • Cefaclor Hives   • Tape Rash     Immunization History   Administered Date(s) Administered   • COVID-19 (PFIZER) PURPLE CAP 2021, 2021, 10/20/2021, 2022   • FluLaval/Fluzone >6mos 10/20/2021   • HPV Quadrivalent 08/15/2008   • Influenza, Unspecified 10/20/2021   • Meningococcal MCV4P (Menactra) 08/15/2008   • Pneumococcal Polysaccharide (PPSV23) 2017   • Tdap 08/15/2008, 2014     Past Medical History:   Diagnosis Date   • Anxiety    • Asthma     inhalers prn   • Depression    • GERD (gastroesophageal reflux disease)    • Migraines    • Neurocardiogenic syncope     no episodes in 2yrs, only sees pcp   • Recurrent tonsillitis    • Seasonal allergies       Past Surgical History:   Procedure Laterality Date   • ABDOMINAL SURGERY      ex lap   •  SECTION      x3   • ENDOSCOPY N/A 2022     Procedure: ESOPHAGOGASTRODUODENOSCOPY wtih biopsies;  Surgeon: Sirisha Thomson MD;  Location: [facility] ENDOSCOPY;  Service: Gastroenterology;  Laterality: N/A;  esophagitis and gastritis   • ENDOSCOPY N/A 2/22/2023    Procedure: ESOPHAGOGASTRODUODENOSCOPY WITH BIOPSIES;  Surgeon: Sirisha Thomson MD;  Location: [facility] ENDOSCOPY;  Service: Gastroenterology;  Laterality: N/A;  GASTRITIS   • UPPER GASTROINTESTINAL ENDOSCOPY        Social History     Socioeconomic History   • Marital status: Single   Tobacco Use   • Smoking status: Never   • Smokeless tobacco: Never   Vaping Use   • Vaping Use: Never used   Substance and Sexual Activity   • Alcohol use: Not Currently     Comment: rarely   • Drug use: Never   • Sexual activity: Defer        Current Outpatient Medications:   •  esomeprazole (nexIUM) 40 MG capsule, Take 1 capsule by mouth Daily., Disp: 90 capsule, Rfl: 1  •  famotidine (Pepcid) 40 MG tablet, Take 1 tablet by mouth Daily., Disp: 90 tablet, Rfl: 1  •  fexofenadine (ALLEGRA) 180 MG tablet, Take 1 tablet by mouth Daily., Disp: , Rfl:   •  fluticasone (FLONASE) 50 MCG/ACT nasal spray, 1-2 sprays by Each Nare route Daily., Disp: , Rfl:   •  fluticasone-salmeterol (Advair HFA) 45-21 MCG/ACT inhaler, Inhale 2 puffs 2 (Two) Times a Day., Disp: 12 g, Rfl: 1  •  ipratropium-albuterol (DUO-NEB) 0.5-2.5 mg/3 ml nebulizer, , Disp: , Rfl:   •  levonorgestrel (MIRENA) 20 MCG/24HR IUD, 1 each by Intrauterine route., Disp: , Rfl:   •  lidocaine (Lidoderm) 5 %, Place 1 patch on the skin as directed by provider Daily. Remove & Discard patch within 12 hours or as directed by MD, Disp: 30 patch, Rfl: 1   Family History   Problem Relation Age of Onset   • Anemia Mother    • COPD Mother    • Depression Mother    • Alcohol abuse Mother    • Alcohol abuse Father    • Depression Brother    • Alcohol abuse Brother    • Arthritis Brother    • Esophageal cancer Maternal Aunt    • Diabetes type II Maternal Grandmother    •  Hypertension Maternal Grandmother    • Breast cancer Maternal Grandmother    • COPD Maternal Grandfather    • Alcohol abuse Maternal Grandfather    • Liver cancer Maternal Grandfather    • Hyperlipidemia Paternal Grandfather    • Hypertension Paternal Grandfather           Vital Signs:   Vitals:    03/16/23 0923   BP: 104/78   Pulse: 90   Temp: 97.7 °F (36.5 °C)   SpO2: 99%   Weight: 98.4 kg (217 lb)       Review of Systems   Physical Exam  Vitals reviewed.   Constitutional:       Appearance: Normal appearance. She is well-developed.   HENT:      Head: Normocephalic and atraumatic.      Right Ear: External ear normal.      Left Ear: External ear normal.      Mouth/Throat:      Pharynx: No oropharyngeal exudate.   Eyes:      Conjunctiva/sclera: Conjunctivae normal.      Pupils: Pupils are equal, round, and reactive to light.   Cardiovascular:      Rate and Rhythm: Normal rate and regular rhythm.      Pulses: Normal pulses.      Heart sounds: Normal heart sounds. No murmur heard.    No friction rub. No gallop.   Pulmonary:      Effort: Pulmonary effort is normal.      Breath sounds: Normal breath sounds. No wheezing or rhonchi.   Abdominal:      General: Abdomen is flat. Bowel sounds are normal. There is no distension.      Palpations: Abdomen is soft. There is no mass.      Tenderness: There is no abdominal tenderness. There is no guarding or rebound.      Hernia: No hernia is present.   Musculoskeletal:         General: Normal range of motion.      Comments: Left thoracic paraspinal muscle tenderness, neg vertebrae tenderness, no redness, warmth, swelling, or bruising.   Skin:     General: Skin is warm and dry.      Capillary Refill: Capillary refill takes less than 2 seconds.   Neurological:      General: No focal deficit present.      Mental Status: She is alert and oriented to person, place, and time.      Cranial Nerves: No cranial nerve deficit.   Psychiatric:         Mood and Affect: Mood and affect normal.          Behavior: Behavior normal.         Thought Content: Thought content normal.         Judgment: Judgment normal.        Result Review :   The following data was reviewed by: Sergio Torres MD on 03/16/2023:  CMP    CMP 1/17/23 2/28/23   Glucose 112 (A) 92   BUN 9 10   Creatinine 0.65 0.79   eGFR 122.4 104.0   Sodium 138 138   Potassium 3.8 4.5   Chloride 107 104   Calcium 9.1 9.8   Total Protein 6.7 6.7   Albumin 4.6 4.2   Globulin 2.1 2.5   Total Bilirubin 0.3 0.5   Alkaline Phosphatase 51 52   AST (SGOT) 21 21   ALT (SGPT) 20 18   Albumin/Globulin Ratio 2.2 1.7   BUN/Creatinine Ratio 13.8 12.7   Anion Gap 5.0 7.7   (A) Abnormal value            CBC    CBC 7/6/22 1/17/23 2/28/23   WBC 6.74 5.75 5.36   RBC 5.20 4.57 4.86   Hemoglobin 15.5 13.8 14.6   Hematocrit 46.0 40.3 42.5   MCV 88.5 88.2 87.4   MCH 29.8 30.2 30.0   MCHC 33.7 34.2 34.4   RDW 12.1 11.7 (A) 11.8 (A)   Platelets 266 237 247   (A) Abnormal value            Data reviewed: Radiologic studies I viewed and interpreted 3 views thoracic spine x-rays:no fxs.          Assessment and Plan    Diagnoses and all orders for this visit:    1. Chronic left-sided thoracic back pain (Primary)  -     XR Spine Thoracic 2 View (In Office)  -     MRI Thoracic Spine Without Contrast; Future  -     lidocaine (Lidoderm) 5 %; Place 1 patch on the skin as directed by provider Daily. Remove & Discard patch within 12 hours or as directed by MD  Dispense: 30 patch; Refill: 1            Follow Up   Return in about 1 month (around 4/16/2023) for Recheck.  Patient was given instructions and counseling regarding her condition or for health maintenance advice. Please see specific information pulled into the AVS if appropriate.

## 2023-03-17 ENCOUNTER — TELEPHONE (OUTPATIENT)
Dept: GASTROENTEROLOGY | Facility: CLINIC | Age: 28
End: 2023-03-17
Payer: COMMERCIAL

## 2023-03-17 NOTE — TELEPHONE ENCOUNTER
Per Dr Thomson, pt to be moved to a sooner appt. OCTAVIO Jackson did not have any sooner appts. Okay'd by Dr MATA to add to 3/28.   Pt states that she has prior u/s and mammogram scheduled on 3/28. Is there an alternative day?

## 2023-03-20 DIAGNOSIS — R10.10 PAIN OF UPPER ABDOMEN: ICD-10-CM

## 2023-03-20 DIAGNOSIS — R14.0 ABDOMINAL BLOATING: Primary | ICD-10-CM

## 2023-03-21 ENCOUNTER — HOSPITAL ENCOUNTER (OUTPATIENT)
Dept: MRI IMAGING | Facility: HOSPITAL | Age: 28
Discharge: HOME | End: 2023-03-21
Admitting: FAMILY MEDICINE
Payer: COMMERCIAL

## 2023-03-21 DIAGNOSIS — G89.29 CHRONIC LEFT-SIDED THORACIC BACK PAIN: ICD-10-CM

## 2023-03-21 DIAGNOSIS — M54.6 CHRONIC LEFT-SIDED THORACIC BACK PAIN: ICD-10-CM

## 2023-03-21 PROCEDURE — 72146 MRI CHEST SPINE W/O DYE: CPT

## 2023-03-21 NOTE — PROGRESS NOTES
MRI of T-spine shows only a small disc protrusion at T8,9 disc.  Minimal spinal canal narrowing from this.  Nothing really significant was seen.  Follow-up as needed.

## 2023-04-11 ENCOUNTER — OFFICE VISIT (OUTPATIENT)
Dept: FAMILY MEDICINE CLINIC | Facility: CLINIC | Age: 28
End: 2023-04-11
Payer: COMMERCIAL

## 2023-04-11 VITALS — HEART RATE: 115 BPM | TEMPERATURE: 97.8 F | BODY MASS INDEX: 36.94 KG/M2 | OXYGEN SATURATION: 95 % | WEIGHT: 222 LBS

## 2023-04-11 DIAGNOSIS — J02.9 PHARYNGITIS, UNSPECIFIED ETIOLOGY: ICD-10-CM

## 2023-04-11 DIAGNOSIS — R09.81 NASAL CONGESTION: ICD-10-CM

## 2023-04-11 DIAGNOSIS — R11.0 NAUSEA: ICD-10-CM

## 2023-04-11 DIAGNOSIS — R19.7 DIARRHEA, UNSPECIFIED TYPE: ICD-10-CM

## 2023-04-11 DIAGNOSIS — J02.9 SORE THROAT: Primary | ICD-10-CM

## 2023-04-11 PROCEDURE — 87880 STREP A ASSAY W/OPTIC: CPT | Performed by: FAMILY MEDICINE

## 2023-04-11 PROCEDURE — 87428 SARSCOV & INF VIR A&B AG IA: CPT | Performed by: FAMILY MEDICINE

## 2023-04-11 PROCEDURE — 1160F RVW MEDS BY RX/DR IN RCRD: CPT | Performed by: FAMILY MEDICINE

## 2023-04-11 PROCEDURE — 99213 OFFICE O/P EST LOW 20 MIN: CPT | Performed by: FAMILY MEDICINE

## 2023-04-11 PROCEDURE — 1159F MED LIST DOCD IN RCRD: CPT | Performed by: FAMILY MEDICINE

## 2023-04-11 RX ORDER — CLINDAMYCIN HYDROCHLORIDE 300 MG/1
300 CAPSULE ORAL 4 TIMES DAILY
Qty: 28 CAPSULE | Refills: 0 | Status: SHIPPED | OUTPATIENT
Start: 2023-04-11 | End: 2023-04-18

## 2023-04-11 RX ORDER — ONDANSETRON 4 MG/1
4 TABLET, FILM COATED ORAL 4 TIMES DAILY PRN
Qty: 30 TABLET | Refills: 1 | Status: SHIPPED | OUTPATIENT
Start: 2023-04-11

## 2023-04-11 RX ORDER — SACCHAROMYCES BOULARDII 250 MG
250 CAPSULE ORAL 2 TIMES DAILY
Qty: 20 CAPSULE | Refills: 0 | Status: SHIPPED | OUTPATIENT
Start: 2023-04-11 | End: 2023-04-21

## 2023-04-11 NOTE — PROGRESS NOTES
Chief Complaint  Sore Throat, Vomiting, Diarrhea, Headache, and Nasal Congestion    Subjective          Charlotte Paredes presents to [facility]  URI   This is a new problem. The current episode started yesterday. The problem has been gradually worsening. There has been no fever. Associated symptoms include congestion, coughing, diarrhea, headaches, joint pain, nausea, a sore throat and vomiting. Pertinent negatives include no abdominal pain, chest pain, dysuria, ear pain, joint swelling, neck pain, plugged ear sensation, rash, rhinorrhea, sinus pain, sneezing, swollen glands or wheezing. Associated symptoms comments: watery brown diarrhea.. She has tried nothing for the symptoms.       Objective   Allergies   Allergen Reactions   • Cefaclor Hives   • Tape Rash     Immunization History   Administered Date(s) Administered   • COVID-19 (PFIZER) PURPLE CAP 2021, 2021, 10/20/2021, 2022   • FluLaval/Fluzone >6mos 10/20/2021   • HPV Quadrivalent 08/15/2008   • Influenza, Unspecified 10/20/2021   • Meningococcal MCV4P (Menactra) 08/15/2008   • Pneumococcal Polysaccharide (PPSV23) 2017   • Tdap 08/15/2008, 2014     Past Medical History:   Diagnosis Date   • Anxiety    • Asthma     inhalers prn   • Depression    • GERD (gastroesophageal reflux disease)    • Migraines    • Neurocardiogenic syncope     no episodes in 2yrs, only sees pcp   • Recurrent tonsillitis    • Seasonal allergies       Past Surgical History:   Procedure Laterality Date   • ABDOMINAL SURGERY      ex lap   •  SECTION      x3   • ENDOSCOPY N/A 2022    Procedure: ESOPHAGOGASTRODUODENOSCOPY wtih biopsies;  Surgeon: Sirisha Thomson MD;  Location: [facility] ENDOSCOPY;  Service: Gastroenterology;  Laterality: N/A;  esophagitis and gastritis   • ENDOSCOPY N/A 2023    Procedure: ESOPHAGOGASTRODUODENOSCOPY WITH BIOPSIES;  Surgeon: Sirisha Thomson MD;  Location: [facility]  ENDOSCOPY;  Service: Gastroenterology;  Laterality: N/A;  GASTRITIS   • UPPER GASTROINTESTINAL ENDOSCOPY        Social History     Socioeconomic History   • Marital status: Single   Tobacco Use   • Smoking status: Never   • Smokeless tobacco: Never   Vaping Use   • Vaping Use: Never used   Substance and Sexual Activity   • Alcohol use: Not Currently     Comment: rarely   • Drug use: Never   • Sexual activity: Defer        Current Outpatient Medications:   •  esomeprazole (nexIUM) 40 MG capsule, Take 1 capsule by mouth Daily., Disp: 90 capsule, Rfl: 1  •  famotidine (Pepcid) 40 MG tablet, Take 1 tablet by mouth Daily., Disp: 90 tablet, Rfl: 1  •  fexofenadine (ALLEGRA) 180 MG tablet, Take 1 tablet by mouth Daily., Disp: , Rfl:   •  fluticasone (FLONASE) 50 MCG/ACT nasal spray, 1-2 sprays by Each Nare route Daily., Disp: , Rfl:   •  fluticasone-salmeterol (Advair HFA) 45-21 MCG/ACT inhaler, Inhale 2 puffs 2 (Two) Times a Day., Disp: 12 g, Rfl: 1  •  ipratropium-albuterol (DUO-NEB) 0.5-2.5 mg/3 ml nebulizer, , Disp: , Rfl:   •  levonorgestrel (MIRENA) 20 MCG/24HR IUD, 1 each by Intrauterine route., Disp: , Rfl:   •  lidocaine (Lidoderm) 5 %, Place 1 patch on the skin as directed by provider Daily. Remove & Discard patch within 12 hours or as directed by MD, Disp: 30 patch, Rfl: 1  •  clindamycin (Cleocin) 300 MG capsule, Take 1 capsule by mouth 4 (Four) Times a Day for 7 days., Disp: 28 capsule, Rfl: 0  •  ondansetron (Zofran) 4 MG tablet, Take 1 tablet by mouth 4 (Four) Times a Day As Needed for Nausea or Vomiting., Disp: 30 tablet, Rfl: 1  •  saccharomyces boulardii (Florastor) 250 MG capsule, Take 1 capsule by mouth 2 (Two) Times a Day for 10 days., Disp: 20 capsule, Rfl: 0   Family History   Problem Relation Age of Onset   • Anemia Mother    • COPD Mother    • Depression Mother    • Alcohol abuse Mother    • Alcohol abuse Father    • Depression Brother    • Alcohol abuse Brother    • Arthritis Brother    •  Esophageal cancer Maternal Aunt    • Diabetes type II Maternal Grandmother    • Hypertension Maternal Grandmother    • Breast cancer Maternal Grandmother    • COPD Maternal Grandfather    • Alcohol abuse Maternal Grandfather    • Liver cancer Maternal Grandfather    • Hyperlipidemia Paternal Grandfather    • Hypertension Paternal Grandfather           Vital Signs:   Vitals:    04/11/23 1059   Pulse: 115   Temp: 97.8 °F (36.6 °C)   SpO2: 95%   Weight: 101 kg (222 lb)       Review of Systems   HENT: Positive for congestion and sore throat. Negative for ear pain, rhinorrhea, sinus pain and sneezing.    Respiratory: Positive for cough. Negative for wheezing.    Cardiovascular: Negative for chest pain.   Gastrointestinal: Positive for diarrhea, nausea and vomiting. Negative for abdominal pain.   Genitourinary: Negative for dysuria.   Musculoskeletal: Positive for joint pain. Negative for neck pain.   Skin: Negative for rash.   Neurological: Positive for headaches.      Physical Exam  Vitals reviewed.   Constitutional:       Appearance: Normal appearance. She is well-developed.   HENT:      Head: Normocephalic and atraumatic.      Right Ear: Tympanic membrane, ear canal and external ear normal.      Left Ear: Tympanic membrane, ear canal and external ear normal.      Nose: Nose normal.      Mouth/Throat:      Mouth: Mucous membranes are moist.      Pharynx: Oropharynx is clear. Posterior oropharyngeal erythema present. No oropharyngeal exudate.   Eyes:      Conjunctiva/sclera: Conjunctivae normal.      Pupils: Pupils are equal, round, and reactive to light.   Cardiovascular:      Rate and Rhythm: Normal rate and regular rhythm.      Pulses: Normal pulses.      Heart sounds: Normal heart sounds. No murmur heard.    No friction rub. No gallop.   Pulmonary:      Effort: Pulmonary effort is normal.      Breath sounds: Normal breath sounds. No wheezing or rhonchi.   Abdominal:      General: Abdomen is flat. Bowel sounds are  normal. There is no distension.      Palpations: Abdomen is soft. There is no mass.      Tenderness: There is no abdominal tenderness. There is no guarding or rebound.      Hernia: No hernia is present.   Musculoskeletal:         General: Normal range of motion.   Skin:     General: Skin is warm and dry.      Capillary Refill: Capillary refill takes less than 2 seconds.   Neurological:      General: No focal deficit present.      Mental Status: She is alert and oriented to person, place, and time.      Cranial Nerves: No cranial nerve deficit.   Psychiatric:         Mood and Affect: Mood and affect normal.         Behavior: Behavior normal.         Thought Content: Thought content normal.         Judgment: Judgment normal.        Result Review :                 Assessment and Plan    Diagnoses and all orders for this visit:    1. Sore throat (Primary)  -     POC Rapid Strep A  -     Cancel: POCT SARS-CoV-2 Antigen GARDENIA  -     POCT SARS-CoV-2 Antigen [place]IA + Flu    2. Nasal congestion  -     POC Rapid Strep A  -     Cancel: POCT SARS-CoV-2 Antigen GARDENIA  -     POCT SARS-CoV-2 Antigen [place]IA + Flu    3. Nausea  -     POC Rapid Strep A  -     Cancel: POCT SARS-CoV-2 Antigen GARDENIA  -     POCT SARS-CoV-2 Antigen GARDENIA + Flu  -     ondansetron (Zofran) 4 MG tablet; Take 1 tablet by mouth 4 (Four) Times a Day As Needed for Nausea or Vomiting.  Dispense: 30 tablet; Refill: 1    4. Pharyngitis, unspecified etiology  -     POCT SARS-CoV-2 Antigen GARDENIA + Flu  -     clindamycin (Cleocin) 300 MG capsule; Take 1 capsule by mouth 4 (Four) Times a Day for 7 days.  Dispense: 28 capsule; Refill: 0    5. Diarrhea, unspecified type  -     POCT SARS-CoV-2 Antigen GARDENIA + Flu  -     saccharomyces boulardii (Florastor) 250 MG capsule; Take 1 capsule by mouth 2 (Two) Times a Day for 10 days.  Dispense: 20 capsule; Refill: 0            Follow Up   Return if symptoms worsen or fail to improve.  Patient was given instructions and counseling regarding her  condition or for health maintenance advice. Please see specific information pulled into the AVS if appropriate.

## 2023-04-20 ENCOUNTER — TRANSCRIBE ORDERS (OUTPATIENT)
Dept: ADMINISTRATIVE | Facility: HOSPITAL | Age: 28
End: 2023-04-20
Payer: COMMERCIAL

## 2023-04-20 DIAGNOSIS — Z12.31 VISIT FOR SCREENING MAMMOGRAM: Primary | ICD-10-CM

## 2023-04-24 ENCOUNTER — HOSPITAL ENCOUNTER (OUTPATIENT)
Dept: NUCLEAR MEDICINE | Facility: HOSPITAL | Age: 28
Discharge: HOME | End: 2023-04-24
Payer: COMMERCIAL

## 2023-04-24 DIAGNOSIS — R10.10 PAIN OF UPPER ABDOMEN: ICD-10-CM

## 2023-04-24 DIAGNOSIS — R14.0 ABDOMINAL BLOATING: ICD-10-CM

## 2023-04-26 ENCOUNTER — HOSPITAL ENCOUNTER (OUTPATIENT)
Dept: ULTRASOUND IMAGING | Facility: HOSPITAL | Age: 28
Discharge: HOME | End: 2023-04-26
Admitting: INTERNAL MEDICINE
Payer: COMMERCIAL

## 2023-04-26 DIAGNOSIS — R14.0 ABDOMINAL BLOATING: ICD-10-CM

## 2023-04-26 DIAGNOSIS — R10.10 PAIN OF UPPER ABDOMEN: ICD-10-CM

## 2023-04-26 PROCEDURE — 76705 ECHO EXAM OF ABDOMEN: CPT

## 2023-04-28 ENCOUNTER — TELEPHONE (OUTPATIENT)
Dept: GASTROENTEROLOGY | Facility: CLINIC | Age: 28
End: 2023-04-28
Payer: COMMERCIAL

## 2023-04-28 NOTE — TELEPHONE ENCOUNTER
----- Message from Sirisha Thomson MD sent at 4/28/2023  1:42 PM EDT -----  RUQ US without any acute findings.

## 2023-05-03 ENCOUNTER — HOSPITAL ENCOUNTER (OUTPATIENT)
Dept: NUCLEAR MEDICINE | Facility: HOSPITAL | Age: 28
Discharge: HOME | End: 2023-05-03
Payer: COMMERCIAL

## 2023-05-03 PROCEDURE — A9537 TC99M MEBROFENIN: HCPCS | Performed by: INTERNAL MEDICINE

## 2023-05-03 PROCEDURE — 0 TECHNETIUM TC 99M MEBROFENIN KIT: Performed by: INTERNAL MEDICINE

## 2023-05-03 PROCEDURE — 78227 HEPATOBIL SYST IMAGE W/DRUG: CPT

## 2023-05-03 RX ORDER — KIT FOR THE PREPARATION OF TECHNETIUM TC 99M MEBROFENIN 45 MG/10ML
1 INJECTION, POWDER, LYOPHILIZED, FOR SOLUTION INTRAVENOUS
Status: COMPLETED | OUTPATIENT
Start: 2023-05-03 | End: 2023-05-03

## 2023-05-03 RX ADMIN — KIT FOR THE PREPARATION OF TECHNETIUM TC 99M MEBROFENIN 1 DOSE: 45 INJECTION, POWDER, LYOPHILIZED, FOR SOLUTION INTRAVENOUS at 08:22

## 2023-05-04 ENCOUNTER — OFFICE VISIT (OUTPATIENT)
Dept: GASTROENTEROLOGY | Facility: CLINIC | Age: 28
End: 2023-05-04
Payer: COMMERCIAL

## 2023-05-04 VITALS
BODY MASS INDEX: 36.42 KG/M2 | HEIGHT: 65 IN | HEART RATE: 80 BPM | SYSTOLIC BLOOD PRESSURE: 117 MMHG | DIASTOLIC BLOOD PRESSURE: 54 MMHG | WEIGHT: 218.6 LBS

## 2023-05-04 DIAGNOSIS — Z80.0 FH: COLON CANCER: ICD-10-CM

## 2023-05-04 DIAGNOSIS — K21.9 GASTROESOPHAGEAL REFLUX DISEASE WITHOUT ESOPHAGITIS: Primary | ICD-10-CM

## 2023-05-04 DIAGNOSIS — K22.70 BARRETT'S ESOPHAGUS WITHOUT DYSPLASIA: ICD-10-CM

## 2023-05-04 DIAGNOSIS — R10.12 LEFT UPPER QUADRANT ABDOMINAL PAIN: ICD-10-CM

## 2023-05-04 PROCEDURE — 99214 OFFICE O/P EST MOD 30 MIN: CPT | Performed by: NURSE PRACTITIONER

## 2023-05-04 PROCEDURE — 1159F MED LIST DOCD IN RCRD: CPT | Performed by: NURSE PRACTITIONER

## 2023-05-04 PROCEDURE — 1160F RVW MEDS BY RX/DR IN RCRD: CPT | Performed by: NURSE PRACTITIONER

## 2023-05-04 RX ORDER — FAMOTIDINE 40 MG/1
40 TABLET, FILM COATED ORAL 2 TIMES DAILY
Qty: 180 TABLET | Refills: 3 | Status: SHIPPED | OUTPATIENT
Start: 2023-05-04

## 2023-05-04 RX ORDER — ESOMEPRAZOLE MAGNESIUM 40 MG/1
40 CAPSULE, DELAYED RELEASE ORAL DAILY
Qty: 90 CAPSULE | Refills: 3 | Status: SHIPPED | OUTPATIENT
Start: 2023-05-04

## 2023-05-04 NOTE — PROGRESS NOTES
Chief Complaint   Barretts Esophagus     History of Present Illness       Charlotte Paredes is a 28 y.o. female who presents to [facility] for follow-up For Obrien's esophagus.  She is new to me today.  She was last seen in the office by nurse practitioner Juan C Pathak on 9/26/2022.    Has history of upper abdominal pain with nausea and GERD.  Has been taking Nexium 40 mg every morning and Pepcid 40 mg every night. Still having some breakthrough reflux. Still having LUQ abd pain. Sees pain mgmt for pain injections. Admits the first shots really helped but feels like the most recent shots were not helpful.  Was told recently after having an MRI that she might have a slipped disc in her back.  She is supposed to follow-up with them next week.  Denies any dysphagia, nausea and vomiting, rectal bleeding or melena.  Admits her appetite is good and her weight is stable.      Underwent EGD for surveillance of Obrien's esophagus with Dr. Thomson on 2/23. EGD showed gastritis. Path + Obrien's esophagus without dysplasia. Recall EGD in 3 years.       HIDA scan was done yesterday which was normal.  Normal abdominal ultrasound.  Had CT scan of the abdomen pelvis done last March that was negative for any acute abdominal process.    Reports her bowels  Move well daily. Denies any fiber or probiotics.     Colonoscopy---never had one    GI family history---maternal grandfather with colon cancer.       Results       Result Review :       CMP        1/17/2023    15:12 2/28/2023    10:00   CMP   Glucose 112   92     BUN 9   10     Creatinine 0.65   0.79     EGFR 122.4   104.0     Sodium 138   138     Potassium 3.8   4.5     Chloride 107   104     Calcium 9.1   9.8     Total Protein 6.7   6.7     Albumin 4.6   4.2     Globulin 2.1   2.5     Total Bilirubin 0.3   0.5     Alkaline Phosphatase 51   52     AST (SGOT) 21   21     ALT (SGPT) 20   18     Albumin/Globulin Ratio 2.2   1.7     BUN/Creatinine  Ratio 13.8   12.7     Anion Gap 5.0   7.7       CBC        2022    14:21 2023    15:12 2023    10:00   CBC   WBC 6.74      5.75   5.36     RBC 5.20      4.57   4.86     Hemoglobin 15.5      13.8   14.6     Hematocrit 46.0      40.3   42.5     MCV 88.5      88.2   87.4     MCH 29.8      30.2   30.0     MCHC 33.7      34.2   34.4     RDW 12.1      11.7   11.8     Platelets 266      237   247         This result is from an external source.           Past Medical History       Past Medical History:   Diagnosis Date   • Anxiety    • Asthma     inhalers prn   • Depression    • GERD (gastroesophageal reflux disease)    • Migraines    • Neurocardiogenic syncope     no episodes in 2yrs, only sees pcp   • Recurrent tonsillitis    • Seasonal allergies        Past Surgical History:   Procedure Laterality Date   • ABDOMINAL SURGERY      ex lap   •  SECTION      x3   • ENDOSCOPY N/A 2022    Procedure: ESOPHAGOGASTRODUODENOSCOPY wtih biopsies;  Surgeon: Sirisha Thomson MD;  Location: [facility] ENDOSCOPY;  Service: Gastroenterology;  Laterality: N/A;  esophagitis and gastritis   • ENDOSCOPY N/A 2023    Procedure: ESOPHAGOGASTRODUODENOSCOPY WITH BIOPSIES;  Surgeon: Sirisha Thomson MD;  Location: [facility] ENDOSCOPY;  Service: Gastroenterology;  Laterality: N/A;  GASTRITIS   • UPPER GASTROINTESTINAL ENDOSCOPY           Current Outpatient Medications:   •  esomeprazole (nexIUM) 40 MG capsule, Take 1 capsule by mouth Daily., Disp: 90 capsule, Rfl: 3  •  famotidine (Pepcid) 40 MG tablet, Take 1 tablet by mouth 2 (Two) Times a Day., Disp: 180 tablet, Rfl: 3  •  fexofenadine (ALLEGRA) 180 MG tablet, Take 1 tablet by mouth Daily., Disp: , Rfl:   •  fluticasone (FLONASE) 50 MCG/ACT nasal spray, 1-2 sprays by Each Nare route Daily., Disp: , Rfl:   •  fluticasone-salmeterol (Advair HFA) 45-21 MCG/ACT inhaler, Inhale 2 puffs 2 (Two) Times a Day., Disp: 12 g, Rfl: 1  •  ipratropium-albuterol  "(DUO-NEB) 0.5-2.5 mg/3 ml nebulizer, , Disp: , Rfl:   •  levonorgestrel (MIRENA) 20 MCG/24HR IUD, 1 each by Intrauterine route., Disp: , Rfl:   •  lidocaine (Lidoderm) 5 %, Place 1 patch on the skin as directed by provider Daily. Remove & Discard patch within 12 hours or as directed by MD, Disp: 30 patch, Rfl: 1  •  ondansetron (Zofran) 4 MG tablet, Take 1 tablet by mouth 4 (Four) Times a Day As Needed for Nausea or Vomiting., Disp: 30 tablet, Rfl: 1     Allergies   Allergen Reactions   • Cefaclor Hives   • Tape Rash       Family History   Problem Relation Age of Onset   • Anemia Mother    • COPD Mother    • Depression Mother    • Alcohol abuse Mother    • Alcohol abuse Father    • Depression Brother    • Alcohol abuse Brother    • Arthritis Brother    • Esophageal cancer Maternal Aunt    • Diabetes type II Maternal Grandmother    • Hypertension Maternal Grandmother    • Breast cancer Maternal Grandmother    • COPD Maternal Grandfather    • Alcohol abuse Maternal Grandfather    • Liver cancer Maternal Grandfather    • Hyperlipidemia Paternal Grandfather    • Hypertension Paternal Grandfather         Social History     Social History Narrative   • Not on file       Objective       Review of Systems   Constitutional: Negative for appetite change, fatigue, fever, unexpected weight gain and unexpected weight loss.   HENT: Negative for trouble swallowing.    Respiratory: Negative for cough, choking, chest tightness, shortness of breath, wheezing and stridor.    Cardiovascular: Negative for chest pain, palpitations and leg swelling.   Gastrointestinal: Positive for abdominal pain. Negative for abdominal distention, anal bleeding, blood in stool, constipation, diarrhea, nausea, rectal pain, vomiting, GERD and indigestion.        Vital Signs:   /54 (BP Location: Right arm, Patient Position: Sitting, Cuff Size: Adult)   Pulse 80   Ht 165.1 cm (65\")   Wt 99.2 kg (218 lb 9.6 oz)   BMI 36.38 kg/m²       Physical " Exam  Constitutional:       General: She is not in acute distress.     Appearance: She is well-developed. She is not ill-appearing.   HENT:      Head: Normocephalic.   Eyes:      Pupils: Pupils are equal, round, and reactive to light.   Cardiovascular:      Rate and Rhythm: Normal rate and regular rhythm.      Heart sounds: Normal heart sounds.   Pulmonary:      Effort: Pulmonary effort is normal.      Breath sounds: Normal breath sounds.   Abdominal:      General: Bowel sounds are normal. There is no distension.      Palpations: Abdomen is soft. There is no mass.      Tenderness: There is abdominal tenderness. There is no guarding or rebound.      Hernia: No hernia is present.       Musculoskeletal:         General: Normal range of motion.   Skin:     General: Skin is warm and dry.   Neurological:      Mental Status: She is alert and oriented to person, place, and time.   Psychiatric:         Speech: Speech normal.         Behavior: Behavior normal.         Judgment: Judgment normal.           Assessment & Plan          Assessment and Plan    Diagnoses and all orders for this visit:    1. Gastroesophageal reflux disease without esophagitis (Primary)  -     esomeprazole (nexIUM) 40 MG capsule; Take 1 capsule by mouth Daily.  Dispense: 90 capsule; Refill: 3  -     famotidine (Pepcid) 40 MG tablet; Take 1 tablet by mouth 2 (Two) Times a Day.  Dispense: 180 tablet; Refill: 3    2. Obrien's esophagus without dysplasia    3. Left upper quadrant abdominal pain    4. FH: colon cancer    Reviewed EGD results with her today.  EGD positive for gastritis and Obrien's esophagus without dysplasia still.  Recall EGD in 3 years.  GERD is still not well controlled throughout the entire day.  Continue Nexium 40 mg every morning and Pepcid 40 mg every evening.  Will increase Pepcid to 40 mg twice daily as needed.  Prescription sent today.  Continue GERD precautions.  Bowels moving well currently.  First screening colonoscopy should  be at age 43.  Patient to follow-up with pain management about her upper abdominal pain.  Reviewed HIDA scan and ultrasound results with her today.  Both the HIDA scan and the abdominal ultrasound were normal.  Patient to call the office with any issues.  Patient to follow-up with me in 1 year.  Patient is agreeable to the plan.            Follow Up       Follow Up   Return in about 1 year (around 5/4/2024) for BARRETTS.  Patient was given instructions and counseling regarding her condition or for health maintenance advice. Please see specific information pulled into the AVS if appropriate.

## 2023-06-08 ENCOUNTER — HOSPITAL ENCOUNTER (OUTPATIENT)
Dept: MAMMOGRAPHY | Facility: HOSPITAL | Age: 28
Discharge: HOME | End: 2023-06-08
Payer: COMMERCIAL

## 2023-06-08 ENCOUNTER — HOSPITAL ENCOUNTER (OUTPATIENT)
Dept: ULTRASOUND IMAGING | Facility: HOSPITAL | Age: 28
Discharge: HOME | End: 2023-06-08
Payer: COMMERCIAL

## 2023-06-08 DIAGNOSIS — L98.8 SKIN LESION OF BREAST: ICD-10-CM

## 2023-06-08 DIAGNOSIS — N63.24 MASS OF LOWER INNER QUADRANT OF LEFT BREAST: ICD-10-CM

## 2023-06-08 PROCEDURE — 77066 DX MAMMO INCL CAD BI: CPT

## 2023-06-08 PROCEDURE — G0279 TOMOSYNTHESIS, MAMMO: HCPCS

## 2023-06-08 PROCEDURE — 76642 ULTRASOUND BREAST LIMITED: CPT

## 2023-06-22 PROBLEM — N63.0 MASS OF BREAST: Status: ACTIVE | Noted: 2023-06-22

## 2023-07-26 ENCOUNTER — HOSPITAL ENCOUNTER (OUTPATIENT)
Dept: MRI IMAGING | Facility: HOSPITAL | Age: 28
Discharge: HOME | End: 2023-07-26
Admitting: SURGERY
Payer: COMMERCIAL

## 2023-07-26 DIAGNOSIS — N63.0 MASS OF BREAST, UNSPECIFIED LATERALITY: ICD-10-CM

## 2023-07-26 PROCEDURE — A9577 INJ MULTIHANCE: HCPCS | Performed by: SURGERY

## 2023-07-26 PROCEDURE — 77049 MRI BREAST C-+ W/CAD BI: CPT

## 2023-07-26 PROCEDURE — 0 GADOBENATE DIMEGLUMINE 529 MG/ML SOLUTION: Performed by: SURGERY

## 2023-07-26 RX ADMIN — GADOBENATE DIMEGLUMINE 20 ML: 529 INJECTION, SOLUTION INTRAVENOUS at 12:02

## 2023-08-28 ENCOUNTER — OFFICE VISIT (OUTPATIENT)
Dept: FAMILY MEDICINE CLINIC | Facility: CLINIC | Age: 28
End: 2023-08-28
Payer: COMMERCIAL

## 2023-08-28 VITALS
BODY MASS INDEX: 36.72 KG/M2 | DIASTOLIC BLOOD PRESSURE: 70 MMHG | OXYGEN SATURATION: 99 % | TEMPERATURE: 97.5 F | HEART RATE: 100 BPM | SYSTOLIC BLOOD PRESSURE: 120 MMHG | WEIGHT: 220.4 LBS | HEIGHT: 65 IN

## 2023-08-28 DIAGNOSIS — M79.631 RIGHT FOREARM PAIN: ICD-10-CM

## 2023-08-28 DIAGNOSIS — R20.0 NUMBNESS AND TINGLING IN RIGHT HAND: ICD-10-CM

## 2023-08-28 DIAGNOSIS — R20.2 NUMBNESS AND TINGLING IN RIGHT HAND: ICD-10-CM

## 2023-08-28 DIAGNOSIS — M25.531 WRIST PAIN, ACUTE, RIGHT: Primary | ICD-10-CM

## 2023-08-28 PROBLEM — O21.0 HYPEREMESIS ARISING DURING PREGNANCY: Status: ACTIVE | Noted: 2023-08-28

## 2023-08-28 PROCEDURE — 99214 OFFICE O/P EST MOD 30 MIN: CPT | Performed by: NURSE PRACTITIONER

## 2023-08-28 RX ORDER — METHYLPREDNISOLONE 4 MG/1
TABLET ORAL
Qty: 21 TABLET | Refills: 0 | Status: SHIPPED | OUTPATIENT
Start: 2023-08-28

## 2023-08-28 NOTE — PROGRESS NOTES
Chief Complaint  Arm Pain (Rt arm x2 weeks) and Hand Pain (Rt hand x2weeks)    Subjective          Charlotte Paredes presents to [facility]  History of Present Illness     Patient presents with complaint of right arm and hand pain.    She reports the pain started in her right forearm approximately 2 weeks ago when she was folding close.  Now she reports discomfort up to the middle of her upper arm, her hands and fingers are numb and tingly.  Her hand and arm feels weaker.    She denies swelling.  She denies specific injury.  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  She states that she is done nothing unusual or out of the ordinary.  She denies a history of pain, numbness, tingling in the arm and hand prior to now.    When asked if she been taking any over-the-counter medications she states that she has been taking Aleve.  She has a history of Obrien's esophagitis and GI recommended that she not take ibuprofen.  She does note that the Aleve seem to help some with the pain but not with the numbness.          Past Medical History:   Diagnosis Date    Anxiety     Asthma     inhalers prn    Depression     GERD (gastroesophageal reflux disease)     Migraines     Neurocardiogenic syncope     no episodes in 2yrs, only sees pcp    Recurrent tonsillitis     Seasonal allergies        Allergies   Allergen Reactions    Cefaclor Hives    Tape Rash        Past Surgical History:   Procedure Laterality Date    ABDOMINAL SURGERY      ex lap     SECTION      x3    ENDOSCOPY N/A 2022    Procedure: ESOPHAGOGASTRODUODENOSCOPY wtih biopsies;  Surgeon: Sirisha Thomson MD;  Location: [facility] ENDOSCOPY;  Service: Gastroenterology;  Laterality: N/A;  esophagitis and gastritis    ENDOSCOPY N/A 2023    Procedure: ESOPHAGOGASTRODUODENOSCOPY WITH BIOPSIES;  Surgeon: Sirisha Thomson MD;  Location: [facility] ENDOSCOPY;  Service: Gastroenterology;  Laterality:  N/A;  GASTRITIS    UPPER GASTROINTESTINAL ENDOSCOPY          Social History     Socioeconomic History    Marital status: Single   Tobacco Use    Smoking status: Never    Smokeless tobacco: Never   Vaping Use    Vaping Use: Never used   Substance and Sexual Activity    Alcohol use: Not Currently     Comment: rarely    Drug use: Never    Sexual activity: Defer       Family History   Problem Relation Age of Onset    Anemia Mother     COPD Mother     Depression Mother     Alcohol abuse Mother     Alcohol abuse Father     Depression Brother     Alcohol abuse Brother     Arthritis Brother     Ovarian cancer Maternal Grandmother     Diabetes type II Maternal Grandmother     Hypertension Maternal Grandmother     Breast cancer Maternal Grandmother     Esophageal cancer Maternal Aunt     COPD Maternal Grandfather     Alcohol abuse Maternal Grandfather     Liver cancer Maternal Grandfather     Hyperlipidemia Paternal Grandfather     Hypertension Paternal Grandfather         Health Maintenance Due   Topic Date Due    Pneumococcal Vaccine 0-64 (2 - PCV) 09/07/2018    ANNUAL PHYSICAL  Never done    PAP SMEAR  Never done    COVID-19 Vaccine (5 - Pfizer series) 03/02/2022        Last Completed Pap Smear       This patient has no relevant Health Maintenance data.            Last Completed Mammogram       This patient has no relevant Health Maintenance data.            Last Completed Colonoscopy       This patient has no relevant Health Maintenance data.            Current Outpatient Medications on File Prior to Visit   Medication Sig    esomeprazole (nexIUM) 40 MG capsule Take 1 capsule by mouth Daily.    famotidine (Pepcid) 40 MG tablet Take 1 tablet by mouth 2 (Two) Times a Day.    fexofenadine (ALLEGRA) 180 MG tablet Take 1 tablet by mouth Daily.    fluticasone (FLONASE) 50 MCG/ACT nasal spray 1-2 sprays by Each Nare route Daily.    fluticasone-salmeterol (Advair HFA) 45-21 MCG/ACT inhaler Inhale 2 puffs 2 (Two) Times a Day.     "ipratropium-albuterol (DUO-NEB) 0.5-2.5 mg/3 ml nebulizer     levonorgestrel (MIRENA) 20 MCG/24HR IUD 1 each by Intrauterine route.    lidocaine (Lidoderm) 5 % Place 1 patch on the skin as directed by provider Daily. Remove & Discard patch within 12 hours or as directed by MD    [DISCONTINUED] ondansetron (Zofran) 4 MG tablet Take 1 tablet by mouth 4 (Four) Times a Day As Needed for Nausea or Vomiting. (Patient not taking: Reported on 8/28/2023)     No current facility-administered medications on file prior to visit.       Immunization History   Administered Date(s) Administered    COVID-19 (PFIZER) Purple Cap Monovalent 03/23/2021, 04/20/2021, 10/20/2021, 01/05/2022    Fluzone >6mos 10/20/2021    HPV Quadrivalent 08/15/2008    Influenza, Unspecified 10/20/2021    Meningococcal MCV4P (Menactra) 08/15/2008    Pneumococcal Polysaccharide (PPSV23) 09/07/2017    Tdap 08/15/2008, 03/11/2014       Review of Systems     Objective     /70 (BP Location: Right arm, Patient Position: Sitting, Cuff Size: Adult)   Pulse 100   Temp 97.5 øF (36.4 øC)   Ht 165.1 cm (65\")   Wt 100 kg (220 lb 6.4 oz)   SpO2 99%   BMI 36.68 kg/mý         Physical Exam  Vitals reviewed.   Constitutional:       General: She is not in acute distress.  HENT:      Head: Normocephalic.   Cardiovascular:      Rate and Rhythm: Normal rate and regular rhythm.      Heart sounds: Normal heart sounds.      Comments: Right wrist pulses normal  Pulmonary:      Effort: Pulmonary effort is normal.      Breath sounds: Normal breath sounds.   Musculoskeletal:      Comments: Right arm: No swelling.  Mild tenderness to right forearm.  Tenderness to all of hand but particularly  in carpometacarpal joint.   Skin:     General: Skin is warm and dry.   Neurological:      Mental Status: She is alert.   Psychiatric:         Mood and Affect: Mood normal.         Result Review :                           Assessment and Plan        Diagnoses and all orders for this " visit:    1. Wrist pain, acute, right (Primary)  -     methylPREDNISolone (MEDROL) 4 MG dose pack; Take as directed on package instructions.  Dispense: 21 tablet; Refill: 0    2. Right forearm pain    3. Numbness and tingling in right hand              Follow Up     With no history of injury most likely reason is irritation.  Wrist splint provided.  Advised to reduce use of right wrist as much as possible.    Advised that Aleve was in the same family as ibuprofen and probably should not be used with her history of Obrien's esophagitis.  We will try steroid Dosepak.      Advised follow-up in 2 weeks with Dr. Torres.    No follow-ups on file.    Patient was given instructions and counseling regarding her condition or for health maintenance advice. Please see specific information pulled into the AVS if appropriate.

## 2023-09-11 ENCOUNTER — OFFICE VISIT (OUTPATIENT)
Dept: FAMILY MEDICINE CLINIC | Facility: CLINIC | Age: 28
End: 2023-09-11
Payer: COMMERCIAL

## 2023-09-11 VITALS
TEMPERATURE: 97.8 F | HEART RATE: 118 BPM | SYSTOLIC BLOOD PRESSURE: 124 MMHG | BODY MASS INDEX: 36.72 KG/M2 | DIASTOLIC BLOOD PRESSURE: 80 MMHG | OXYGEN SATURATION: 98 % | HEIGHT: 65 IN | WEIGHT: 220.4 LBS

## 2023-09-11 DIAGNOSIS — M25.531 RIGHT WRIST PAIN: Primary | ICD-10-CM

## 2023-09-11 DIAGNOSIS — R63.5 WEIGHT GAIN: ICD-10-CM

## 2023-09-11 DIAGNOSIS — R25.2 MUSCLE CRAMP: ICD-10-CM

## 2023-09-11 LAB
ALBUMIN SERPL-MCNC: 4.3 G/DL (ref 3.5–5.2)
ALBUMIN/GLOB SERPL: 1.8 G/DL
ALP SERPL-CCNC: 54 U/L (ref 39–117)
ALT SERPL W P-5'-P-CCNC: 21 U/L (ref 1–33)
ANION GAP SERPL CALCULATED.3IONS-SCNC: 9 MMOL/L (ref 5–15)
AST SERPL-CCNC: 21 U/L (ref 1–32)
BILIRUB SERPL-MCNC: 0.3 MG/DL (ref 0–1.2)
BUN SERPL-MCNC: 13 MG/DL (ref 6–20)
BUN/CREAT SERPL: 14.8 (ref 7–25)
CALCIUM SPEC-SCNC: 9.9 MG/DL (ref 8.6–10.5)
CHLORIDE SERPL-SCNC: 105 MMOL/L (ref 98–107)
CK SERPL-CCNC: 148 U/L (ref 20–180)
CO2 SERPL-SCNC: 25 MMOL/L (ref 22–29)
CREAT SERPL-MCNC: 0.88 MG/DL (ref 0.57–1)
EGFRCR SERPLBLD CKD-EPI 2021: 90.8 ML/MIN/1.73
GLOBULIN UR ELPH-MCNC: 2.4 GM/DL
GLUCOSE SERPL-MCNC: 88 MG/DL (ref 65–99)
MAGNESIUM SERPL-MCNC: 2.6 MG/DL (ref 1.6–2.6)
POTASSIUM SERPL-SCNC: 4.4 MMOL/L (ref 3.5–5.2)
PROT SERPL-MCNC: 6.7 G/DL (ref 6–8.5)
SODIUM SERPL-SCNC: 139 MMOL/L (ref 136–145)
T4 FREE SERPL-MCNC: 1.3 NG/DL (ref 0.93–1.7)
TSH SERPL DL<=0.05 MIU/L-ACNC: 1.01 UIU/ML (ref 0.27–4.2)

## 2023-09-11 PROCEDURE — 80053 COMPREHEN METABOLIC PANEL: CPT | Performed by: FAMILY MEDICINE

## 2023-09-11 PROCEDURE — 83735 ASSAY OF MAGNESIUM: CPT | Performed by: FAMILY MEDICINE

## 2023-09-11 PROCEDURE — 82550 ASSAY OF CK (CPK): CPT | Performed by: FAMILY MEDICINE

## 2023-09-11 PROCEDURE — 84443 ASSAY THYROID STIM HORMONE: CPT | Performed by: FAMILY MEDICINE

## 2023-09-11 PROCEDURE — 84439 ASSAY OF FREE THYROXINE: CPT | Performed by: FAMILY MEDICINE

## 2023-09-11 RX ORDER — SEMAGLUTIDE 0.25 MG/.5ML
0.25 INJECTION, SOLUTION SUBCUTANEOUS WEEKLY
Qty: 2 ML | Refills: 3 | Status: SHIPPED | OUTPATIENT
Start: 2023-09-11

## 2023-09-11 RX ORDER — METHOCARBAMOL 500 MG/1
500 TABLET, FILM COATED ORAL 4 TIMES DAILY
Qty: 56 TABLET | Refills: 0 | Status: SHIPPED | OUTPATIENT
Start: 2023-09-11 | End: 2023-09-11

## 2023-09-11 RX ORDER — CYCLOBENZAPRINE HCL 5 MG
5 TABLET ORAL 3 TIMES DAILY PRN
Qty: 56 TABLET | Refills: 1 | Status: SHIPPED | OUTPATIENT
Start: 2023-09-11

## 2023-09-11 NOTE — PROGRESS NOTES
Chief Complaint  Wrist Pain and muscle cramps    Subjective          Charlotte Paredes presents to [facility]  History of Present Illness  Pt has had right wrist pain taht is improving with treatment- pt wants to continue current tx and does not want physical therapy at this time- pt has misplaced the brace- she will start using again    Pt has been having a lot of leg muscle cramps over the last week- no known injury- no swelling, redness, or warmth    Pt has been having more trouble with weight loss over last few months           Objective   Allergies   Allergen Reactions    Cefaclor Hives    Tape Rash     Immunization History   Administered Date(s) Administered    COVID-19 (PFIZER) Purple Cap Monovalent 2021, 2021, 10/20/2021, 2022    Fluzone >6mos 10/20/2021    HPV Quadrivalent 08/15/2008    Influenza, Unspecified 10/20/2021    Meningococcal MCV4P (Menactra) 08/15/2008    Pneumococcal Polysaccharide (PPSV23) 2017    Tdap 08/15/2008, 2014     Past Medical History:   Diagnosis Date    Anxiety     Asthma     inhalers prn    Depression     GERD (gastroesophageal reflux disease)     Migraines     Neurocardiogenic syncope     no episodes in 2yrs, only sees pcp    Recurrent tonsillitis     Seasonal allergies       Past Surgical History:   Procedure Laterality Date    ABDOMINAL SURGERY      ex lap     SECTION      x3    ENDOSCOPY N/A 2022    Procedure: ESOPHAGOGASTRODUODENOSCOPY wtih biopsies;  Surgeon: Sirisha Thomson MD;  Location: [facility] ENDOSCOPY;  Service: Gastroenterology;  Laterality: N/A;  esophagitis and gastritis    ENDOSCOPY N/A 2023    Procedure: ESOPHAGOGASTRODUODENOSCOPY WITH BIOPSIES;  Surgeon: Sirisha Thomson MD;  Location: [facility] ENDOSCOPY;  Service: Gastroenterology;  Laterality: N/A;  GASTRITIS    UPPER GASTROINTESTINAL ENDOSCOPY        Social History     Socioeconomic History    Marital status: Single    Tobacco Use    Smoking status: Never    Smokeless tobacco: Never   Vaping Use    Vaping Use: Never used   Substance and Sexual Activity    Alcohol use: Not Currently     Comment: rarely    Drug use: Never    Sexual activity: Defer        Current Outpatient Medications:     esomeprazole (nexIUM) 40 MG capsule, Take 1 capsule by mouth Daily., Disp: 90 capsule, Rfl: 3    famotidine (Pepcid) 40 MG tablet, Take 1 tablet by mouth 2 (Two) Times a Day., Disp: 180 tablet, Rfl: 3    fexofenadine (ALLEGRA) 180 MG tablet, Take 1 tablet by mouth Daily., Disp: , Rfl:     fluticasone (FLONASE) 50 MCG/ACT nasal spray, 1-2 sprays by Each Nare route Daily., Disp: , Rfl:     fluticasone-salmeterol (Advair HFA) 45-21 MCG/ACT inhaler, Inhale 2 puffs 2 (Two) Times a Day., Disp: 12 g, Rfl: 1    ipratropium-albuterol (DUO-NEB) 0.5-2.5 mg/3 ml nebulizer, , Disp: , Rfl:     levonorgestrel (MIRENA) 20 MCG/24HR IUD, 1 each by Intrauterine route., Disp: , Rfl:     lidocaine (Lidoderm) 5 %, Place 1 patch on the skin as directed by provider Daily. Remove & Discard patch within 12 hours or as directed by MD, Disp: 30 patch, Rfl: 1    cyclobenzaprine (FLEXERIL) 5 MG tablet, Take 1 tablet by mouth 3 (Three) Times a Day As Needed for Muscle Spasms., Disp: 56 tablet, Rfl: 1    Semaglutide-Weight Management (Wegovy) 0.25 MG/0.5ML solution auto-injector, Inject 0.25 mg under the skin into the appropriate area as directed 1 (One) Time Per Week., Disp: 2 mL, Rfl: 3   Family History   Problem Relation Age of Onset    Anemia Mother     COPD Mother     Depression Mother     Alcohol abuse Mother     Alcohol abuse Father     Depression Brother     Alcohol abuse Brother     Arthritis Brother     Ovarian cancer Maternal Grandmother     Diabetes type II Maternal Grandmother     Hypertension Maternal Grandmother     Breast cancer Maternal Grandmother     Esophageal cancer Maternal Aunt     COPD Maternal Grandfather     Alcohol abuse Maternal Grandfather      "Liver cancer Maternal Grandfather     Hyperlipidemia Paternal Grandfather     Hypertension Paternal Grandfather           Vital Signs:   Vitals:    09/11/23 0801   BP: 124/80   BP Location: Left arm   Patient Position: Sitting   Cuff Size: Adult   Pulse: 118   Temp: 97.8 °F (36.6 °C)   SpO2: 98%   Weight: 100 kg (220 lb 6.4 oz)   Height: 165.1 cm (65\")       Review of Systems   Constitutional:  Negative for fatigue and fever.   HENT:  Negative for sore throat.    Eyes:  Negative for visual disturbance.   Respiratory:  Negative for cough, chest tightness, shortness of breath and wheezing.    Cardiovascular:  Negative for chest pain, palpitations and leg swelling.   Gastrointestinal:  Negative for abdominal pain, diarrhea, nausea and vomiting.   Neurological:  Negative for light-headedness and headaches.    Physical Exam  Vitals reviewed.   Constitutional:       Appearance: Normal appearance. She is well-developed.   HENT:      Head: Normocephalic and atraumatic.      Right Ear: External ear normal.      Left Ear: External ear normal.      Mouth/Throat:      Pharynx: No oropharyngeal exudate.   Eyes:      Conjunctiva/sclera: Conjunctivae normal.      Pupils: Pupils are equal, round, and reactive to light.   Cardiovascular:      Rate and Rhythm: Normal rate and regular rhythm.      Pulses: Normal pulses.      Heart sounds: Normal heart sounds. No murmur heard.    No friction rub. No gallop.   Pulmonary:      Effort: Pulmonary effort is normal.      Breath sounds: Normal breath sounds. No wheezing or rhonchi.   Abdominal:      General: Abdomen is flat. Bowel sounds are normal. There is no distension.      Palpations: Abdomen is soft. There is no mass.      Tenderness: There is no abdominal tenderness. There is no guarding or rebound.      Hernia: No hernia is present.   Musculoskeletal:         General: Normal range of motion.   Skin:     General: Skin is warm and dry.      Capillary Refill: Capillary refill takes less " than 2 seconds.   Neurological:      Mental Status: She is alert and oriented to person, place, and time.      Cranial Nerves: No cranial nerve deficit.   Psychiatric:         Mood and Affect: Mood and affect normal.         Behavior: Behavior normal.         Thought Content: Thought content normal.         Judgment: Judgment normal.      Result Review :   The following data was reviewed by: Sergio Torres MD on 09/11/2023:  CMP          1/17/2023    15:12 2/28/2023    10:00   CMP   Glucose 112  92    BUN 9  10    Creatinine 0.65  0.79    EGFR 122.4  104.0    Sodium 138  138    Potassium 3.8  4.5    Chloride 107  104    Calcium 9.1  9.8    Total Protein 6.7  6.7    Albumin 4.6  4.2    Globulin 2.1  2.5    Total Bilirubin 0.3  0.5    Alkaline Phosphatase 51  52    AST (SGOT) 21  21    ALT (SGPT) 20  18    Albumin/Globulin Ratio 2.2  1.7    BUN/Creatinine Ratio 13.8  12.7    Anion Gap 5.0  7.7      CBC          1/17/2023    15:12 2/28/2023    10:00   CBC   WBC 5.75  5.36    RBC 4.57  4.86    Hemoglobin 13.8  14.6    Hematocrit 40.3  42.5    MCV 88.2  87.4    MCH 30.2  30.0    MCHC 34.2  34.4    RDW 11.7  11.8    Platelets 237  247        TSH          1/17/2023    15:12   TSH   TSH 1.110                Assessment and Plan    Diagnoses and all orders for this visit:    1. Right wrist pain (Primary)    2. Muscle cramp  -     Discontinue: methocarbamol (ROBAXIN) 500 MG tablet; Take 1 tablet by mouth 4 (Four) Times a Day.  Dispense: 56 tablet; Refill: 0  -     Comprehensive Metabolic Panel  -     Magnesium  -     CK  -     cyclobenzaprine (FLEXERIL) 5 MG tablet; Take 1 tablet by mouth 3 (Three) Times a Day As Needed for Muscle Spasms.  Dispense: 56 tablet; Refill: 1    3. Weight gain  -     TSH+Free T4  -     Semaglutide-Weight Management (Wegovy) 0.25 MG/0.5ML solution auto-injector; Inject 0.25 mg under the skin into the appropriate area as directed 1 (One) Time Per Week.  Dispense: 2 mL; Refill: 3          Continue  tx  Follow Up   Return in 1 month (on 10/11/2023), or if symptoms worsen or fail to improve, for Recheck.  Patient was given instructions and counseling regarding her condition or for health maintenance advice. Please see specific information pulled into the AVS if appropriate.

## 2023-09-11 NOTE — PROGRESS NOTES
...  Venipuncture Blood Specimen Collection  Venipuncture performed in LT arm by Faustina Jerez MA with good hemostasis. Patient tolerated the procedure well without complications.   09/11/23   Faustina Jerez MA

## 2023-10-09 ENCOUNTER — OFFICE VISIT (OUTPATIENT)
Dept: FAMILY MEDICINE CLINIC | Facility: CLINIC | Age: 28
End: 2023-10-09
Payer: COMMERCIAL

## 2023-10-09 VITALS
WEIGHT: 221.4 LBS | DIASTOLIC BLOOD PRESSURE: 80 MMHG | TEMPERATURE: 97.7 F | HEART RATE: 118 BPM | SYSTOLIC BLOOD PRESSURE: 120 MMHG | OXYGEN SATURATION: 98 % | BODY MASS INDEX: 36.89 KG/M2 | HEIGHT: 65 IN

## 2023-10-09 DIAGNOSIS — M25.531 RIGHT WRIST PAIN: ICD-10-CM

## 2023-10-09 DIAGNOSIS — E66.09 CLASS 2 OBESITY DUE TO EXCESS CALORIES WITHOUT SERIOUS COMORBIDITY WITH BODY MASS INDEX (BMI) OF 36.0 TO 36.9 IN ADULT: ICD-10-CM

## 2023-10-09 DIAGNOSIS — Z23 NEED FOR INFLUENZA VACCINATION: Primary | ICD-10-CM

## 2023-10-09 RX ORDER — NALTREXONE HYDROCHLORIDE AND BUPROPION HYDROCHLORIDE 8; 90 MG/1; MG/1
1 TABLET, EXTENDED RELEASE ORAL DAILY
Qty: 30 TABLET | Refills: 1 | Status: SHIPPED | OUTPATIENT
Start: 2023-10-09

## 2023-10-09 NOTE — PROGRESS NOTES
Chief Complaint  Wrist Pain (Right wrist)    Subjective          Charlotte Pareeds presents to [facility]  History of Present Illness  Pt has right wrist pain that is improving with the brace- will get x-ray since pain is ongoing    Discussed GI side effects of wegovy- pt wants to start contrave instead- pt has no h/o seizures               Objective   Allergies   Allergen Reactions    Cefaclor Hives    Tape Rash     Immunization History   Administered Date(s) Administered    COVID-19 (PFIZER) Purple Cap Monovalent 2021, 2021, 10/20/2021, 2022    Fluzone (or Fluarix & Flulaval for VFC) >6mos 10/20/2021    HPV Quadrivalent 08/15/2008    Influenza, Unspecified 10/20/2021    Meningococcal MCV4P (Menactra) 08/15/2008    Pneumococcal Polysaccharide (PPSV23) 2017    Tdap 08/15/2008, 2014     Past Medical History:   Diagnosis Date    Anxiety     Asthma     inhalers prn    Depression     GERD (gastroesophageal reflux disease)     Migraines     Neurocardiogenic syncope     no episodes in 2yrs, only sees pcp    Recurrent tonsillitis     Seasonal allergies       Past Surgical History:   Procedure Laterality Date    ABDOMINAL SURGERY  2012    ex lap     SECTION      x3    ENDOSCOPY N/A 2022    Procedure: ESOPHAGOGASTRODUODENOSCOPY wtih biopsies;  Surgeon: Sirisha Thomson MD;  Location: [facility] ENDOSCOPY;  Service: Gastroenterology;  Laterality: N/A;  esophagitis and gastritis    ENDOSCOPY N/A 2023    Procedure: ESOPHAGOGASTRODUODENOSCOPY WITH BIOPSIES;  Surgeon: Sirisha Thomson MD;  Location: [facility] ENDOSCOPY;  Service: Gastroenterology;  Laterality: N/A;  GASTRITIS    UPPER GASTROINTESTINAL ENDOSCOPY        Social History     Socioeconomic History    Marital status: Single   Tobacco Use    Smoking status: Never    Smokeless tobacco: Never   Vaping Use    Vaping Use: Never used   Substance and Sexual Activity    Alcohol use: Not  Currently     Comment: rarely    Drug use: Never    Sexual activity: Defer        Current Outpatient Medications:     cyclobenzaprine (FLEXERIL) 5 MG tablet, Take 1 tablet by mouth 3 (Three) Times a Day As Needed for Muscle Spasms., Disp: 56 tablet, Rfl: 1    esomeprazole (nexIUM) 40 MG capsule, Take 1 capsule by mouth Daily., Disp: 90 capsule, Rfl: 3    famotidine (Pepcid) 40 MG tablet, Take 1 tablet by mouth 2 (Two) Times a Day., Disp: 180 tablet, Rfl: 3    fexofenadine (ALLEGRA) 180 MG tablet, Take 1 tablet by mouth Daily., Disp: , Rfl:     fluticasone (FLONASE) 50 MCG/ACT nasal spray, 1-2 sprays by Each Nare route Daily., Disp: , Rfl:     fluticasone-salmeterol (Advair HFA) 45-21 MCG/ACT inhaler, Inhale 2 puffs 2 (Two) Times a Day., Disp: 12 g, Rfl: 1    ipratropium-albuterol (DUO-NEB) 0.5-2.5 mg/3 ml nebulizer, , Disp: , Rfl:     levonorgestrel (MIRENA) 20 MCG/24HR IUD, 1 each by Intrauterine route., Disp: , Rfl:     lidocaine (Lidoderm) 5 %, Place 1 patch on the skin as directed by provider Daily. Remove & Discard patch within 12 hours or as directed by MD, Disp: 30 patch, Rfl: 1    naltrexone-bupropion ER (Contrave) 8-90 MG tablet, Take 1 tablet by mouth Daily., Disp: 30 tablet, Rfl: 1   Family History   Problem Relation Age of Onset    Anemia Mother     COPD Mother     Depression Mother     Alcohol abuse Mother     Alcohol abuse Father     Depression Brother     Alcohol abuse Brother     Arthritis Brother     Ovarian cancer Maternal Grandmother     Diabetes type II Maternal Grandmother     Hypertension Maternal Grandmother     Breast cancer Maternal Grandmother     Esophageal cancer Maternal Aunt     COPD Maternal Grandfather     Alcohol abuse Maternal Grandfather     Liver cancer Maternal Grandfather     Hyperlipidemia Paternal Grandfather     Hypertension Paternal Grandfather           Vital Signs:   Vitals:    10/09/23 0814   BP: 120/80   BP Location: Left arm   Patient Position: Sitting   Cuff Size:  "Adult   Pulse: 118   Temp: 97.7 øF (36.5 øC)   SpO2: 98%   Weight: 100 kg (221 lb 6.4 oz)   Height: 165.1 cm (65\")       Review of Systems   Physical Exam  Vitals reviewed.   Constitutional:       Appearance: Normal appearance. She is well-developed.   HENT:      Head: Normocephalic and atraumatic.      Right Ear: External ear normal.      Left Ear: External ear normal.      Mouth/Throat:      Pharynx: No oropharyngeal exudate.   Eyes:      Conjunctiva/sclera: Conjunctivae normal.      Pupils: Pupils are equal, round, and reactive to light.   Cardiovascular:      Rate and Rhythm: Normal rate and regular rhythm.      Pulses: Normal pulses.      Heart sounds: Normal heart sounds. No murmur heard.     No friction rub. No gallop.   Pulmonary:      Effort: Pulmonary effort is normal.      Breath sounds: Normal breath sounds. No wheezing or rhonchi.   Abdominal:      General: Abdomen is flat. Bowel sounds are normal. There is no distension.      Palpations: Abdomen is soft. There is no mass.      Tenderness: There is no abdominal tenderness. There is no guarding or rebound.      Hernia: No hernia is present.   Musculoskeletal:         General: Normal range of motion.      Comments: No change in right wrist exam from last visit   Skin:     General: Skin is warm and dry.      Capillary Refill: Capillary refill takes less than 2 seconds.   Neurological:      General: No focal deficit present.      Mental Status: She is alert and oriented to person, place, and time.      Cranial Nerves: No cranial nerve deficit.   Psychiatric:         Mood and Affect: Mood and affect normal.         Behavior: Behavior normal.         Thought Content: Thought content normal.         Judgment: Judgment normal.        Result Review :   The following data was reviewed by: Sergio Torres MD on 10/09/2023:    Data reviewed : Radiologic studies I viewed and interpreted 3 views right wrist x-rays:no fxs.           Assessment and Plan    Diagnoses " and all orders for this visit:    1. Need for influenza vaccination (Primary)  -     Fluzone (or Fluarix & Flulaval for VFC) >6mos    2. Right wrist pain  -     XR Wrist 3+ View Right (In Office)    3. Class 2 obesity due to excess calories without serious comorbidity with body mass index (BMI) of 36.0 to 36.9 in adult  -     naltrexone-bupropion ER (Contrave) 8-90 MG tablet; Take 1 tablet by mouth Daily.  Dispense: 30 tablet; Refill: 1            Follow Up   Return in about 3 weeks (around 10/30/2023), or if symptoms worsen or fail to improve, for Recheck.  Patient was given instructions and counseling regarding her condition or for health maintenance advice. Please see specific information pulled into the AVS if appropriate.

## 2023-10-26 ENCOUNTER — TELEPHONE (OUTPATIENT)
Dept: FAMILY MEDICINE CLINIC | Facility: CLINIC | Age: 28
End: 2023-10-26

## 2023-10-26 NOTE — TELEPHONE ENCOUNTER
Caller: Charlotte Paredes    Relationship to patient: Self    Best call back number:    [phone] (Mobile)       Patient is needing: PATIENT CALLED IN AND IS REQUESTING A COPY OF IMMUNIZATION RECORDS, RECORDS REGARDING FLU SHOTS AND ANY TITER RECORDS SHE MIGHT HAVE FOR EMPLOYMENT REASONS. PATIENT SAID IT IS OKAY TO LEAVE MESSAGE ON PHONE

## 2023-11-10 ENCOUNTER — OFFICE VISIT (OUTPATIENT)
Dept: FAMILY MEDICINE CLINIC | Facility: CLINIC | Age: 28
End: 2023-11-10
Payer: COMMERCIAL

## 2023-11-10 VITALS
TEMPERATURE: 98.2 F | HEIGHT: 65 IN | BODY MASS INDEX: 36.59 KG/M2 | HEART RATE: 96 BPM | WEIGHT: 219.6 LBS | OXYGEN SATURATION: 99 % | DIASTOLIC BLOOD PRESSURE: 80 MMHG | SYSTOLIC BLOOD PRESSURE: 120 MMHG

## 2023-11-10 DIAGNOSIS — J06.9 VIRAL URI WITH COUGH: ICD-10-CM

## 2023-11-10 DIAGNOSIS — J02.9 SORE THROAT: ICD-10-CM

## 2023-11-10 DIAGNOSIS — E66.01 CLASS 2 SEVERE OBESITY DUE TO EXCESS CALORIES WITH SERIOUS COMORBIDITY AND BODY MASS INDEX (BMI) OF 36.0 TO 36.9 IN ADULT: Primary | ICD-10-CM

## 2023-11-10 DIAGNOSIS — R05.1 ACUTE COUGH: ICD-10-CM

## 2023-11-10 DIAGNOSIS — K59.00 CONSTIPATION, UNSPECIFIED CONSTIPATION TYPE: ICD-10-CM

## 2023-11-10 PROBLEM — E66.812 CLASS 2 SEVERE OBESITY DUE TO EXCESS CALORIES WITH SERIOUS COMORBIDITY AND BODY MASS INDEX (BMI) OF 36.0 TO 36.9 IN ADULT: Status: ACTIVE | Noted: 2023-11-10

## 2023-11-10 RX ORDER — BENZONATATE 100 MG/1
100 CAPSULE ORAL 3 TIMES DAILY PRN
Qty: 30 CAPSULE | Refills: 0 | Status: SHIPPED | OUTPATIENT
Start: 2023-11-10

## 2023-11-10 NOTE — PROGRESS NOTES
"Chief Complaint  Sore Throat, Cough, and Earache (left)    Subjective      History of Present Illness  Charlotte Paredes is a 28 y.o. female who presents to [facility] with a past medical history of  Past Medical History:   Diagnosis Date    Anxiety     Asthma     inhalers prn    Depression     GERD (gastroesophageal reflux disease)     Migraines     Neurocardiogenic syncope     no episodes in 2yrs, only sees pcp    Recurrent tonsillitis     Seasonal allergies      Sore throat and cough.  Also has a left earache. Been sick for about 5-6 days. No fever or chills. Nonproductive cough. Has really bad allergies but they have not been bothersome lately.  She is already on Allegra and Flonase.  Some body aches but not a whole lot.    All week her lower back has been hurting too. She has a longer work at work too. Feels like she is constipated too. Had a stool each day but small firm stools. No blood in the stool. No urinary symptoms.    Objective   Vital Signs:   Vitals:    11/10/23 1724   BP: 120/80   BP Location: Left arm   Patient Position: Sitting   Cuff Size: Adult   Pulse: 96   Temp: 98.2 °F (36.8 °C)   SpO2: 99%   Weight: 99.6 kg (219 lb 9.6 oz)   Height: 165.1 cm (65\")       Wt Readings from Last 3 Encounters:   11/10/23 99.6 kg (219 lb 9.6 oz)   10/09/23 100 kg (221 lb 6.4 oz)   09/11/23 100 kg (220 lb 6.4 oz)     BP Readings from Last 3 Encounters:   11/10/23 120/80   10/09/23 120/80   09/11/23 124/80       Health Maintenance   Topic Date Due    Pneumococcal Vaccine 0-64 (2 - PCV) 09/07/2018    ANNUAL PHYSICAL  Never done    PAP SMEAR  Never done    COVID-19 Vaccine (5 - 2023-24 season) 09/01/2023    TDAP/TD VACCINES (3 - Td or Tdap) 03/11/2024    BMI FOLLOWUP  10/09/2024    GASTROSCOPY (EGD)  02/22/2026    HEPATITIS C SCREENING  Completed    INFLUENZA VACCINE  Completed       Physical Exam  Vitals and nursing note reviewed.   Constitutional:       General: She is not in acute " distress.     Appearance: Normal appearance.   HENT:      Head: Normocephalic and atraumatic.      Right Ear: Tympanic membrane and ear canal normal. There is no impacted cerumen.      Left Ear: Tympanic membrane and ear canal normal. There is no impacted cerumen.      Nose: No rhinorrhea.      Mouth/Throat:      Pharynx: Posterior oropharyngeal erythema present. No oropharyngeal exudate.   Cardiovascular:      Rate and Rhythm: Normal rate and regular rhythm.      Heart sounds: No murmur heard.  Pulmonary:      Effort: Pulmonary effort is normal. No respiratory distress.      Breath sounds: Normal breath sounds. No wheezing.   Abdominal:      General: There is no distension.      Palpations: Abdomen is soft. There is no mass.      Tenderness: There is no abdominal tenderness.   Musculoskeletal:      Cervical back: No rigidity or tenderness.      Comments: Lower back nontender, full back ROM   Lymphadenopathy:      Cervical: No cervical adenopathy.   Skin:     General: Skin is warm and dry.   Neurological:      Mental Status: She is alert.   Psychiatric:         Mood and Affect: Mood normal.         Behavior: Behavior normal.            Result Review :  The following data was reviewed by: Gerson Voss MD on 11/10/2023:  Lab Results   Lab 11/10/23  1800   SARS ANTIGEN Not Detected      Lab Results   Lab 11/10/23  1800   INFLUENZA A ANTIGEN GARDENIA Not Detected                Lab Results   Lab 11/10/23  1800   INFLUENZA B ANTIGEN GARDENIA Not Detected      Lab Results   Lab 11/10/23  1759   RAPID STREP A SCREEN Negative                                      Procedures        Assessment and Plan   Diagnoses and all orders for this visit:    1. Class 2 severe obesity due to excess calories with serious comorbidity and body mass index (BMI) of 36.0 to 36.9 in adult (Primary)    2. Acute cough  -     benzonatate (Tessalon Perles) 100 MG capsule; Take 1 capsule by mouth 3 (Three) Times a Day As Needed for Cough.  Dispense: 30  capsule; Refill: 0    3. Sore throat  -     POCT rapid strep A  -     POCT SARS-CoV-2 Antigen GARDENIA + Flu    4. Constipation, unspecified constipation type  Comments:  She has MiraLAX at home and will use that for constipation.  We also discussed increasing fiber intake.    5. Viral URI with cough      Swabs negative in office. Advised supportive measures such as hydration and rest. Can do OTC supportive medications such as an antihistamine or decongestant if needed. Honey can be soothing to a sore throat. Return if new or worsening symptoms.           FOLLOW UP  Return if symptoms worsen or fail to improve.  Patient was given instructions and counseling regarding her condition or for health maintenance advice. Please see specific information pulled into the AVS if appropriate.       Gerson Voss MD  11/10/23  18:03 EST    CURRENT & DISCONTINUED MEDICATIONS  Current Outpatient Medications   Medication Instructions    benzonatate (TESSALON PERLES) 100 mg, Oral, 3 Times Daily PRN    cyclobenzaprine (FLEXERIL) 5 mg, Oral, 3 Times Daily PRN    esomeprazole (NEXIUM) 40 mg, Oral, Daily    famotidine (PEPCID) 40 mg, Oral, 2 Times Daily    fexofenadine (ALLEGRA) 180 mg, Oral, Daily    fluticasone (FLONASE) 50 MCG/ACT nasal spray 1-2 sprays, Each Nare, Daily    fluticasone-salmeterol (Advair HFA) 45-21 MCG/ACT inhaler 2 puffs, Inhalation, 2 Times Daily - RT    ipratropium-albuterol (DUO-NEB) 0.5-2.5 mg/3 ml nebulizer No dose, route, or frequency recorded.    levonorgestrel (MIRENA) 20 MCG/24HR IUD 1 each, Intrauterine    lidocaine (Lidoderm) 5 % 1 patch, Transdermal, Every 24 Hours, Remove & Discard patch within 12 hours or as directed by MD    naltrexone-bupropion ER (Contrave) 8-90 MG tablet 1 tablet, Oral, Daily       There are no discontinued medications.

## 2024-01-17 ENCOUNTER — TELEPHONE (OUTPATIENT)
Dept: GASTROENTEROLOGY | Facility: CLINIC | Age: 29
End: 2024-01-17
Payer: COMMERCIAL

## 2024-01-17 NOTE — TELEPHONE ENCOUNTER
Attempted to contact the patient in regards to the appointment that she has scheduled on 05/07/24 at 8 am with Lindsey Rankin needing to be rescheduled due to her being at the [facility] that day. Patient did not answer so I left a voicemail requesting a call back.     HUB can reschedule the appointment for another day in May if the patient calls back.

## 2024-01-19 NOTE — TELEPHONE ENCOUNTER
Attempted to contact the patient for the second time in regards to the appointment that she has scheduled on 05/07/24 at 8 am with Lindsey Rankin needing to be rescheduled due to her being at the [facility] that day. Patient did not answer so I left a voicemail requesting a call back.     HUB can reschedule the appointment for another day in May if the patient calls back.

## 2024-01-22 NOTE — TELEPHONE ENCOUNTER
Contacted the patient and verified her information then advised that I was calling in regards to the appointment that she has scheduled with Lindsey Rankin on 05/07/24 at 8 am needing to be rescheduled due to her being at the [facility] for the [place] office. Patient verbalized understanding and has been rescheduled for 05/09/24 at 3:30 pm.

## 2024-04-02 ENCOUNTER — OFFICE VISIT (OUTPATIENT)
Dept: FAMILY MEDICINE CLINIC | Facility: CLINIC | Age: 29
End: 2024-04-02
Payer: COMMERCIAL

## 2024-04-02 VITALS
SYSTOLIC BLOOD PRESSURE: 110 MMHG | OXYGEN SATURATION: 98 % | TEMPERATURE: 97.9 F | WEIGHT: 222 LBS | HEART RATE: 102 BPM | BODY MASS INDEX: 36.94 KG/M2 | DIASTOLIC BLOOD PRESSURE: 70 MMHG

## 2024-04-02 DIAGNOSIS — R19.7 DIARRHEA, UNSPECIFIED TYPE: ICD-10-CM

## 2024-04-02 DIAGNOSIS — J02.9 PHARYNGITIS, UNSPECIFIED ETIOLOGY: ICD-10-CM

## 2024-04-02 DIAGNOSIS — J30.89 NON-SEASONAL ALLERGIC RHINITIS, UNSPECIFIED TRIGGER: ICD-10-CM

## 2024-04-02 DIAGNOSIS — E66.01 CLASS 2 SEVERE OBESITY DUE TO EXCESS CALORIES WITH SERIOUS COMORBIDITY AND BODY MASS INDEX (BMI) OF 36.0 TO 36.9 IN ADULT: ICD-10-CM

## 2024-04-02 DIAGNOSIS — R11.2 NAUSEA AND VOMITING, UNSPECIFIED VOMITING TYPE: Primary | ICD-10-CM

## 2024-04-02 PROCEDURE — 87428 SARSCOV & INF VIR A&B AG IA: CPT | Performed by: FAMILY MEDICINE

## 2024-04-02 PROCEDURE — 99213 OFFICE O/P EST LOW 20 MIN: CPT | Performed by: FAMILY MEDICINE

## 2024-04-02 PROCEDURE — 87880 STREP A ASSAY W/OPTIC: CPT | Performed by: FAMILY MEDICINE

## 2024-04-02 PROCEDURE — 1160F RVW MEDS BY RX/DR IN RCRD: CPT | Performed by: FAMILY MEDICINE

## 2024-04-02 PROCEDURE — 1159F MED LIST DOCD IN RCRD: CPT | Performed by: FAMILY MEDICINE

## 2024-04-02 RX ORDER — FLUTICASONE PROPIONATE 50 MCG
1-2 SPRAY, SUSPENSION (ML) NASAL DAILY
Qty: 16 G | Refills: 5 | Status: SHIPPED | OUTPATIENT
Start: 2024-04-02

## 2024-04-02 RX ORDER — SACCHAROMYCES BOULARDII 250 MG
250 CAPSULE ORAL DAILY
Qty: 10 CAPSULE | Refills: 0 | Status: SHIPPED | OUTPATIENT
Start: 2024-04-02

## 2024-04-02 RX ORDER — AZITHROMYCIN 250 MG/1
TABLET, FILM COATED ORAL
Qty: 6 TABLET | Refills: 0 | Status: SHIPPED | OUTPATIENT
Start: 2024-04-02

## 2024-04-02 RX ORDER — SEMAGLUTIDE 0.25 MG/.5ML
0.25 INJECTION, SOLUTION SUBCUTANEOUS WEEKLY
Qty: 2 ML | Refills: 1 | Status: SHIPPED | OUTPATIENT
Start: 2024-04-02

## 2024-04-02 RX ORDER — ONDANSETRON 4 MG/1
4 TABLET, FILM COATED ORAL 4 TIMES DAILY PRN
Qty: 28 TABLET | Refills: 0 | Status: SHIPPED | OUTPATIENT
Start: 2024-04-02

## 2024-04-02 NOTE — PROGRESS NOTES
Chief Complaint  Vomiting, Diarrhea, and Abdominal Pain    Subjective          Charlotte Paredes presents to [facility]  Vomiting   This is a new problem. The current episode started yesterday. The problem occurs 2 to 4 times per day. The problem has been unchanged. The emesis has an appearance of stomach contents. There has been no fever. Associated symptoms include arthralgias, chills, coughing, diarrhea, headaches and URI. Pertinent negatives include no abdominal pain, chest pain, dizziness, fever, myalgias, sweats or weight loss. Associated symptoms comments: Watery brown diarrhea.. She has tried nothing for the symptoms.                Objective   Allergies   Allergen Reactions    Cefaclor Hives    Tape Rash     Immunization History   Administered Date(s) Administered    COVID-19 (PFIZER) Purple Cap Monovalent 2021, 2021, 10/20/2021, 2022    Fluzone (or Fluarix & Flulaval for VFC) >6mos 10/20/2021, 10/09/2023    HPV Quadrivalent 08/15/2008    Influenza, Unspecified 10/20/2021    Meningococcal MCV4P (Menactra) 08/15/2008    Pneumococcal Polysaccharide (PPSV23) 2017    Tdap 08/15/2008, 2014     Past Medical History:   Diagnosis Date    Anxiety     Asthma     inhalers prn    Depression     GERD (gastroesophageal reflux disease)     Migraines     Neurocardiogenic syncope     no episodes in 2yrs, only sees pcp    Recurrent tonsillitis     Seasonal allergies       Past Surgical History:   Procedure Laterality Date    ABDOMINAL SURGERY      ex lap     SECTION      x3    ENDOSCOPY N/A 2022    Procedure: ESOPHAGOGASTRODUODENOSCOPY wtih biopsies;  Surgeon: Sirisha Thomson MD;  Location: [facility] ENDOSCOPY;  Service: Gastroenterology;  Laterality: N/A;  esophagitis and gastritis    ENDOSCOPY N/A 2023    Procedure: ESOPHAGOGASTRODUODENOSCOPY WITH BIOPSIES;  Surgeon: Sirisha Thomson MD;  Location: [facility] ENDOSCOPY;  Service:  Gastroenterology;  Laterality: N/A;  GASTRITIS    UPPER GASTROINTESTINAL ENDOSCOPY        Social History     Socioeconomic History    Marital status: Single   Tobacco Use    Smoking status: Never    Smokeless tobacco: Never   Vaping Use    Vaping status: Never Used   Substance and Sexual Activity    Alcohol use: Not Currently     Comment: rarely    Drug use: Never    Sexual activity: Defer        Current Outpatient Medications:     cyclobenzaprine (FLEXERIL) 5 MG tablet, Take 1 tablet by mouth 3 (Three) Times a Day As Needed for Muscle Spasms., Disp: 56 tablet, Rfl: 1    esomeprazole (nexIUM) 40 MG capsule, Take 1 capsule by mouth Daily., Disp: 90 capsule, Rfl: 3    famotidine (Pepcid) 40 MG tablet, Take 1 tablet by mouth 2 (Two) Times a Day., Disp: 180 tablet, Rfl: 3    fexofenadine (ALLEGRA) 180 MG tablet, Take 1 tablet by mouth Daily., Disp: , Rfl:     fluticasone (FLONASE) 50 MCG/ACT nasal spray, 1-2 sprays by Each Nare route Daily., Disp: 16 g, Rfl: 5    ipratropium-albuterol (DUO-NEB) 0.5-2.5 mg/3 ml nebulizer, , Disp: , Rfl:     levonorgestrel (MIRENA) 20 MCG/24HR IUD, 1 each by Intrauterine route., Disp: , Rfl:     lidocaine (Lidoderm) 5 %, Place 1 patch on the skin as directed by provider Daily. Remove & Discard patch within 12 hours or as directed by MD, Disp: 30 patch, Rfl: 1    azithromycin (Zithromax Z-Jamar) 250 MG tablet, Take 2 tablets by mouth on day 1, then 1 tablet daily on days 2-5, Disp: 6 tablet, Rfl: 0    ondansetron (Zofran) 4 MG tablet, Take 1 tablet by mouth 4 (Four) Times a Day As Needed for Nausea or Vomiting., Disp: 28 tablet, Rfl: 0    saccharomyces boulardii (Florastor) 250 MG capsule, Take 1 capsule by mouth Daily., Disp: 10 capsule, Rfl: 0    Semaglutide-Weight Management (Wegovy) 0.25 MG/0.5ML solution auto-injector, Inject 0.5 mL under the skin into the appropriate area as directed 1 (One) Time Per Week., Disp: 2 mL, Rfl: 1   Family History   Problem Relation Age of Onset    Anemia  Mother     COPD Mother     Depression Mother     Alcohol abuse Mother     Alcohol abuse Father     Depression Brother     Alcohol abuse Brother     Arthritis Brother     Ovarian cancer Maternal Grandmother     Diabetes type II Maternal Grandmother     Hypertension Maternal Grandmother     Breast cancer Maternal Grandmother     Esophageal cancer Maternal Aunt     COPD Maternal Grandfather     Alcohol abuse Maternal Grandfather     Liver cancer Maternal Grandfather     Hyperlipidemia Paternal Grandfather     Hypertension Paternal Grandfather           Vital Signs:   Vitals:    04/02/24 0934   BP: 110/70   Pulse: 102   Temp: 97.9 °F (36.6 °C)   SpO2: 98%   Weight: 101 kg (222 lb)       Review of Systems   Constitutional:  Positive for chills. Negative for fever and weight loss.   HENT:  Negative for sore throat.    Eyes:  Negative for visual disturbance.   Respiratory:  Positive for cough. Negative for chest tightness, shortness of breath and wheezing.    Cardiovascular:  Negative for chest pain, palpitations and leg swelling.   Gastrointestinal:  Positive for diarrhea, nausea and vomiting. Negative for abdominal pain.   Musculoskeletal:  Positive for arthralgias. Negative for myalgias.   Neurological:  Positive for headaches. Negative for dizziness and light-headedness.      Physical Exam  Vitals reviewed.   Constitutional:       Appearance: Normal appearance. She is well-developed.   HENT:      Head: Normocephalic and atraumatic.      Right Ear: Tympanic membrane, ear canal and external ear normal.      Left Ear: Tympanic membrane, ear canal and external ear normal.      Nose: Nose normal.      Mouth/Throat:      Mouth: Mucous membranes are moist.      Pharynx: Oropharynx is clear. Posterior oropharyngeal erythema present. No oropharyngeal exudate.   Eyes:      Conjunctiva/sclera: Conjunctivae normal.      Pupils: Pupils are equal, round, and reactive to light.   Cardiovascular:      Rate and Rhythm: Normal rate and  regular rhythm.      Pulses: Normal pulses.      Heart sounds: Normal heart sounds. No murmur heard.     No friction rub. No gallop.   Pulmonary:      Effort: Pulmonary effort is normal.      Breath sounds: Normal breath sounds. No wheezing or rhonchi.   Abdominal:      General: Abdomen is flat. Bowel sounds are normal. There is no distension.      Palpations: Abdomen is soft. There is no mass.      Tenderness: There is no abdominal tenderness. There is no guarding or rebound.      Hernia: No hernia is present.   Musculoskeletal:         General: Normal range of motion.      Cervical back: Normal range of motion and neck supple.   Skin:     General: Skin is warm and dry.      Capillary Refill: Capillary refill takes less than 2 seconds.   Neurological:      General: No focal deficit present.      Mental Status: She is alert and oriented to person, place, and time.      Cranial Nerves: No cranial nerve deficit.   Psychiatric:         Mood and Affect: Mood and affect normal.         Behavior: Behavior normal.         Thought Content: Thought content normal.         Judgment: Judgment normal.        Result Review :                 Assessment and Plan    Diagnoses and all orders for this visit:    1. Nausea and vomiting, unspecified vomiting type (Primary)  -     POCT SARS-CoV-2 + Flu Antigen [place]  -     ondansetron (Zofran) 4 MG tablet; Take 1 tablet by mouth 4 (Four) Times a Day As Needed for Nausea or Vomiting.  Dispense: 28 tablet; Refill: 0    2. Diarrhea, unspecified type  -     POCT SARS-CoV-2 + Flu Antigen [place]  -     saccharomyces boulardii (Florastor) 250 MG capsule; Take 1 capsule by mouth Daily.  Dispense: 10 capsule; Refill: 0    3. Pharyngitis, unspecified etiology  -     Cancel: POCT LUIS SARS-CoV-2 Antigen [place]  -     Cancel: POCT Influenza A/B  -     POCT rapid strep A  -     azithromycin (Zithromax Z-Jamar) 250 MG tablet; Take 2 tablets by mouth on day 1, then 1 tablet daily on days 2-5  Dispense: 6  tablet; Refill: 0    4. Non-seasonal allergic rhinitis, unspecified trigger  -     fluticasone (FLONASE) 50 MCG/ACT nasal spray; 1-2 sprays by Each Nare route Daily.  Dispense: 16 g; Refill: 5  -     POCT SARS-CoV-2 + Flu Antigen [place]    5. Class 2 severe obesity due to excess calories with serious comorbidity and body mass index (BMI) of 36.0 to 36.9 in adult  -     Semaglutide-Weight Management (Wegovy) 0.25 MG/0.5ML solution auto-injector; Inject 0.5 mL under the skin into the appropriate area as directed 1 (One) Time Per Week.  Dispense: 2 mL; Refill: 1            Follow Up   Return if symptoms worsen or fail to improve.  Patient was given instructions and counseling regarding her condition or for health maintenance advice. Please see specific information pulled into the AVS if appropriate.

## 2024-04-11 DIAGNOSIS — E66.01 CLASS 2 SEVERE OBESITY DUE TO EXCESS CALORIES WITH SERIOUS COMORBIDITY AND BODY MASS INDEX (BMI) OF 36.0 TO 36.9 IN ADULT: ICD-10-CM

## 2024-04-11 RX ORDER — SEMAGLUTIDE 0.25 MG/.5ML
0.25 INJECTION, SOLUTION SUBCUTANEOUS WEEKLY
Qty: 2 ML | Refills: 1 | Status: SHIPPED | OUTPATIENT
Start: 2024-04-11

## 2024-05-01 DIAGNOSIS — K21.9 GASTROESOPHAGEAL REFLUX DISEASE WITHOUT ESOPHAGITIS: ICD-10-CM

## 2024-05-01 RX ORDER — ESOMEPRAZOLE MAGNESIUM 40 MG/1
40 CAPSULE, DELAYED RELEASE ORAL DAILY
Qty: 90 CAPSULE | Refills: 3 | Status: SHIPPED | OUTPATIENT
Start: 2024-05-01

## 2024-05-02 ENCOUNTER — OFFICE VISIT (OUTPATIENT)
Dept: FAMILY MEDICINE CLINIC | Facility: CLINIC | Age: 29
End: 2024-05-02
Payer: COMMERCIAL

## 2024-05-02 VITALS
TEMPERATURE: 97.1 F | SYSTOLIC BLOOD PRESSURE: 120 MMHG | HEART RATE: 73 BPM | BODY MASS INDEX: 37.06 KG/M2 | OXYGEN SATURATION: 97 % | DIASTOLIC BLOOD PRESSURE: 85 MMHG | WEIGHT: 222.7 LBS

## 2024-05-02 DIAGNOSIS — R10.30 LOWER ABDOMINAL PAIN: Primary | ICD-10-CM

## 2024-05-02 LAB
ALBUMIN SERPL-MCNC: 4.6 G/DL (ref 3.5–5.2)
ALBUMIN/GLOB SERPL: 1.6 G/DL
ALP SERPL-CCNC: 58 U/L (ref 39–117)
ALT SERPL W P-5'-P-CCNC: 15 U/L (ref 1–33)
ANION GAP SERPL CALCULATED.3IONS-SCNC: 9.9 MMOL/L (ref 5–15)
AST SERPL-CCNC: 21 U/L (ref 1–32)
BASOPHILS # BLD AUTO: 0.04 10*3/MM3 (ref 0–0.2)
BASOPHILS NFR BLD AUTO: 0.6 % (ref 0–1.5)
BILIRUB BLD-MCNC: NEGATIVE MG/DL
BILIRUB SERPL-MCNC: 0.4 MG/DL (ref 0–1.2)
BUN SERPL-MCNC: 10 MG/DL (ref 6–20)
BUN/CREAT SERPL: 14.5 (ref 7–25)
CALCIUM SPEC-SCNC: 9.7 MG/DL (ref 8.6–10.5)
CHLORIDE SERPL-SCNC: 104 MMOL/L (ref 98–107)
CLARITY, POC: CLEAR
CO2 SERPL-SCNC: 25.1 MMOL/L (ref 22–29)
COLOR UR: YELLOW
CREAT SERPL-MCNC: 0.69 MG/DL (ref 0.57–1)
DEPRECATED RDW RBC AUTO: 37.8 FL (ref 37–54)
EGFRCR SERPLBLD CKD-EPI 2021: 119.2 ML/MIN/1.73
EOSINOPHIL # BLD AUTO: 0.44 10*3/MM3 (ref 0–0.4)
EOSINOPHIL NFR BLD AUTO: 6.7 % (ref 0.3–6.2)
ERYTHROCYTE [DISTWIDTH] IN BLOOD BY AUTOMATED COUNT: 11.9 % (ref 12.3–15.4)
EXPIRATION DATE: ABNORMAL
GLOBULIN UR ELPH-MCNC: 2.8 GM/DL
GLUCOSE SERPL-MCNC: 79 MG/DL (ref 65–99)
GLUCOSE UR STRIP-MCNC: NEGATIVE MG/DL
HCT VFR BLD AUTO: 46 % (ref 34–46.6)
HGB BLD-MCNC: 15.7 G/DL (ref 12–15.9)
IMM GRANULOCYTES # BLD AUTO: 0.02 10*3/MM3 (ref 0–0.05)
IMM GRANULOCYTES NFR BLD AUTO: 0.3 % (ref 0–0.5)
KETONES UR QL: NEGATIVE
LEUKOCYTE EST, POC: NEGATIVE
LYMPHOCYTES # BLD AUTO: 1.97 10*3/MM3 (ref 0.7–3.1)
LYMPHOCYTES NFR BLD AUTO: 30 % (ref 19.6–45.3)
Lab: ABNORMAL
MCH RBC QN AUTO: 30.1 PG (ref 26.6–33)
MCHC RBC AUTO-ENTMCNC: 34.1 G/DL (ref 31.5–35.7)
MCV RBC AUTO: 88.1 FL (ref 79–97)
MONOCYTES # BLD AUTO: 0.6 10*3/MM3 (ref 0.1–0.9)
MONOCYTES NFR BLD AUTO: 9.1 % (ref 5–12)
NEUTROPHILS NFR BLD AUTO: 3.49 10*3/MM3 (ref 1.7–7)
NEUTROPHILS NFR BLD AUTO: 53.3 % (ref 42.7–76)
NITRITE UR-MCNC: NEGATIVE MG/ML
NRBC BLD AUTO-RTO: 0 /100 WBC (ref 0–0.2)
PH UR: 5.5 [PH] (ref 5–8)
PLATELET # BLD AUTO: 256 10*3/MM3 (ref 140–450)
PMV BLD AUTO: 9 FL (ref 6–12)
POTASSIUM SERPL-SCNC: 3.9 MMOL/L (ref 3.5–5.2)
PROT SERPL-MCNC: 7.4 G/DL (ref 6–8.5)
PROT UR STRIP-MCNC: NEGATIVE MG/DL
RBC # BLD AUTO: 5.22 10*6/MM3 (ref 3.77–5.28)
RBC # UR STRIP: ABNORMAL /UL
SODIUM SERPL-SCNC: 139 MMOL/L (ref 136–145)
SP GR UR: 1 (ref 1–1.03)
UROBILINOGEN UR QL: ABNORMAL
WBC NRBC COR # BLD AUTO: 6.56 10*3/MM3 (ref 3.4–10.8)

## 2024-05-02 PROCEDURE — 80053 COMPREHEN METABOLIC PANEL: CPT | Performed by: FAMILY MEDICINE

## 2024-05-02 PROCEDURE — 85025 COMPLETE CBC W/AUTO DIFF WBC: CPT | Performed by: FAMILY MEDICINE

## 2024-05-02 RX ORDER — DICYCLOMINE HYDROCHLORIDE 10 MG/1
10 CAPSULE ORAL
Qty: 40 CAPSULE | Refills: 0 | Status: SHIPPED | OUTPATIENT
Start: 2024-05-02

## 2024-05-02 NOTE — PROGRESS NOTES
Venipuncture Blood Specimen Collection  Venipuncture performed in left arm by Mckenna Ulloa with good hemostasis. Patient tolerated the procedure well without complications.   05/02/24   Mckenna Ulloa

## 2024-05-02 NOTE — PROGRESS NOTES
Chief Complaint  Hospital Follow Up Visit (24 [facility] low abdominal pain and back pain/)    Subjective          Charlotte Paredes presents to [facility]  History of Present Illness  Pt follpwing up from recent ER visit to [facility]- reviewed ER notes- has had lower abd pain- had normal CT abdomen/pelvis and pelvic U/S and normal labs- pt has had lower abdominal pain that is achy now- was sharp before- pain is resolving  Pt has mirena- pt will call GYN today and see if they need to evaluate this               Objective   Allergies   Allergen Reactions    Cefaclor Hives    Tape Rash     Immunization History   Administered Date(s) Administered    COVID-19 (PFIZER) Purple Cap Monovalent 2021, 2021, 10/20/2021, 2022    Fluzone (or Fluarix & Flulaval for VFC) >6mos 10/20/2021, 10/09/2023    HPV Quadrivalent 08/15/2008    Influenza, Unspecified 10/20/2021    Meningococcal MCV4P (Menactra) 08/15/2008    Pneumococcal Polysaccharide (PPSV23) 2017    Tdap 08/15/2008, 2014     Past Medical History:   Diagnosis Date    Anxiety     Asthma     inhalers prn    Depression     GERD (gastroesophageal reflux disease)     Migraines     Neurocardiogenic syncope     no episodes in 2yrs, only sees pcp    Recurrent tonsillitis     Seasonal allergies       Past Surgical History:   Procedure Laterality Date    ABDOMINAL SURGERY      ex lap     SECTION      x3    ENDOSCOPY N/A 2022    Procedure: ESOPHAGOGASTRODUODENOSCOPY wtih biopsies;  Surgeon: Sirisha Thomson MD;  Location: [facility];  Service: Gastroenterology;  Laterality: N/A;  esophagitis and gastritis    ENDOSCOPY N/A 2023    Procedure: ESOPHAGOGASTRODUODENOSCOPY WITH BIOPSIES;  Surgeon: Sirisha Thomson MD;  Location: [facility] END[facility]COPY;  Service: Gastroenterology;  Laterality: N/A;  GASTRITIS    UPPER GASTROINTESTINAL ENDOSCOPY        Social History     Socioeconomic History    Marital  status: Single   Tobacco Use    Smoking status: Never    Smokeless tobacco: Never   Vaping Use    Vaping status: Never Used   Substance and Sexual Activity    Alcohol use: Not Currently     Comment: rarely    Drug use: Never    Sexual activity: Defer        Current Outpatient Medications:     cyclobenzaprine (FLEXERIL) 5 MG tablet, Take 1 tablet by mouth 3 (Three) Times a Day As Needed for Muscle Spasms., Disp: 56 tablet, Rfl: 1    esomeprazole (nexIUM) 40 MG capsule, TAKE ONE CAPSULE BY MOUTH DAILY, Disp: 90 capsule, Rfl: 3    famotidine (Pepcid) 40 MG tablet, Take 1 tablet by mouth 2 (Two) Times a Day., Disp: 180 tablet, Rfl: 3    fexofenadine (ALLEGRA) 180 MG tablet, Take 1 tablet by mouth Daily., Disp: , Rfl:     fluticasone (FLONASE) 50 MCG/ACT nasal spray, 1-2 sprays by Each Nare route Daily., Disp: 16 g, Rfl: 5    ipratropium-albuterol (DUO-NEB) 0.5-2.5 mg/3 ml nebulizer, , Disp: , Rfl:     levonorgestrel (MIRENA) 20 MCG/24HR IUD, 1 each by Intrauterine route., Disp: , Rfl:     lidocaine (Lidoderm) 5 %, Place 1 patch on the skin as directed by provider Daily. Remove & Discard patch within 12 hours or as directed by MD, Disp: 30 patch, Rfl: 1    ondansetron (Zofran) 4 MG tablet, Take 1 tablet by mouth 4 (Four) Times a Day As Needed for Nausea or Vomiting., Disp: 28 tablet, Rfl: 0    dicyclomine (BENTYL) 10 MG capsule, Take 1 capsule by mouth 4 (Four) Times a Day Before Meals & at Bedtime., Disp: 40 capsule, Rfl: 0   Family History   Problem Relation Age of Onset    Anemia Mother     COPD Mother     Depression Mother     Alcohol abuse Mother     Alcohol abuse Father     Depression Brother     Alcohol abuse Brother     Arthritis Brother     Ovarian cancer Maternal Grandmother     Diabetes type II Maternal Grandmother     Hypertension Maternal Grandmother     Breast cancer Maternal Grandmother     Esophageal cancer Maternal Aunt     COPD Maternal Grandfather     Alcohol abuse Maternal Grandfather     Liver cancer  Maternal Grandfather     Hyperlipidemia Paternal Grandfather     Hypertension Paternal Grandfather           Vital Signs:   Vitals:    05/02/24 0917 05/02/24 0928   BP: 120/90 120/85   Pulse: 73    Temp: 97.1 °F (36.2 °C)    SpO2: 97%    Weight: 101 kg (222 lb 11.2 oz)        Review of Systems   Constitutional:  Negative for fatigue and fever.   HENT:  Negative for sore throat.    Eyes:  Negative for visual disturbance.   Respiratory:  Negative for cough, chest tightness, shortness of breath and wheezing.    Cardiovascular:  Negative for chest pain, palpitations and leg swelling.   Gastrointestinal:  Positive for abdominal pain and nausea. Negative for diarrhea and vomiting.   Neurological:  Negative for dizziness, light-headedness and headaches.      Physical Exam  Vitals reviewed.   Constitutional:       Appearance: Normal appearance. She is well-developed.   HENT:      Head: Normocephalic and atraumatic.      Right Ear: External ear normal.      Left Ear: External ear normal.      Mouth/Throat:      Pharynx: No oropharyngeal exudate.   Eyes:      Conjunctiva/sclera: Conjunctivae normal.      Pupils: Pupils are equal, round, and reactive to light.   Cardiovascular:      Rate and Rhythm: Normal rate and regular rhythm.      Pulses: Normal pulses.      Heart sounds: Normal heart sounds. No murmur heard.     No friction rub. No gallop.   Pulmonary:      Effort: Pulmonary effort is normal.      Breath sounds: Normal breath sounds. No wheezing or rhonchi.   Abdominal:      General: Abdomen is flat. Bowel sounds are normal. There is no distension.      Palpations: Abdomen is soft. There is no mass.      Tenderness: There is no abdominal tenderness. There is no right CVA tenderness, left CVA tenderness, guarding or rebound.      Hernia: No hernia is present.   Musculoskeletal:         General: Normal range of motion.   Skin:     General: Skin is warm and dry.      Capillary Refill: Capillary refill takes less than 2  seconds.   Neurological:      General: No focal deficit present.      Mental Status: She is alert and oriented to person, place, and time.      Cranial Nerves: No cranial nerve deficit.   Psychiatric:         Mood and Affect: Mood and affect normal.         Behavior: Behavior normal.         Thought Content: Thought content normal.         Judgment: Judgment normal.        Result Review :   The following data was reviewed by: Sergio Torres MD on 05/02/2024:  CMP          9/11/2023    08:41   CMP   Glucose 88    BUN 13    Creatinine 0.88    EGFR 90.8    Sodium 139    Potassium 4.4    Chloride 105    Calcium 9.9    Total Protein 6.7    Albumin 4.3    Globulin 2.4    Total Bilirubin 0.3    Alkaline Phosphatase 54    AST (SGOT) 21    ALT (SGPT) 21    Albumin/Globulin Ratio 1.8    BUN/Creatinine Ratio 14.8    Anion Gap 9.0          TSH          9/11/2023    08:41   TSH   TSH 1.010                Assessment and Plan    Diagnoses and all orders for this visit:    1. Lower abdominal pain (Primary)  -     POC Urinalysis Dipstick, Automated  -     CBC Auto Differential  -     Comprehensive Metabolic Panel  -     dicyclomine (BENTYL) 10 MG capsule; Take 1 capsule by mouth 4 (Four) Times a Day Before Meals & at Bedtime.  Dispense: 40 capsule; Refill: 0            Follow Up   Return in 1 week (on 5/9/2024), or if symptoms worsen or fail to improve, for Recheck.  Patient was given instructions and counseling regarding her condition or for health maintenance advice. Please see specific information pulled into the AVS if appropriate.

## 2024-05-09 ENCOUNTER — OFFICE VISIT (OUTPATIENT)
Dept: GASTROENTEROLOGY | Facility: CLINIC | Age: 29
End: 2024-05-09
Payer: COMMERCIAL

## 2024-05-09 VITALS
WEIGHT: 228.2 LBS | HEART RATE: 87 BPM | HEIGHT: 65 IN | BODY MASS INDEX: 38.02 KG/M2 | SYSTOLIC BLOOD PRESSURE: 112 MMHG | DIASTOLIC BLOOD PRESSURE: 68 MMHG

## 2024-05-09 DIAGNOSIS — K21.00 GASTROESOPHAGEAL REFLUX DISEASE WITH ESOPHAGITIS WITHOUT HEMORRHAGE: Primary | ICD-10-CM

## 2024-05-09 DIAGNOSIS — Z87.19 HISTORY OF GASTRITIS: ICD-10-CM

## 2024-05-09 DIAGNOSIS — K22.70 BARRETT'S ESOPHAGUS WITHOUT DYSPLASIA: ICD-10-CM

## 2024-05-09 PROCEDURE — 99214 OFFICE O/P EST MOD 30 MIN: CPT | Performed by: NURSE PRACTITIONER

## 2024-05-09 PROCEDURE — 1160F RVW MEDS BY RX/DR IN RCRD: CPT | Performed by: NURSE PRACTITIONER

## 2024-05-09 PROCEDURE — 1159F MED LIST DOCD IN RCRD: CPT | Performed by: NURSE PRACTITIONER

## 2025-05-13 ENCOUNTER — OFFICE VISIT (OUTPATIENT)
Dept: GASTROENTEROLOGY | Facility: CLINIC | Age: 30
End: 2025-05-13
Payer: COMMERCIAL

## 2025-05-13 VITALS
DIASTOLIC BLOOD PRESSURE: 69 MMHG | HEIGHT: 65 IN | SYSTOLIC BLOOD PRESSURE: 113 MMHG | BODY MASS INDEX: 35.02 KG/M2 | WEIGHT: 210.2 LBS | HEART RATE: 81 BPM | OXYGEN SATURATION: 98 %

## 2025-05-13 DIAGNOSIS — K22.70 BARRETT'S ESOPHAGUS WITHOUT DYSPLASIA: ICD-10-CM

## 2025-05-13 DIAGNOSIS — Z80.0 FAMILY HISTORY OF THROAT CANCER: ICD-10-CM

## 2025-05-13 DIAGNOSIS — K21.00 GASTROESOPHAGEAL REFLUX DISEASE WITH ESOPHAGITIS WITHOUT HEMORRHAGE: Primary | ICD-10-CM

## 2025-05-13 PROCEDURE — 99214 OFFICE O/P EST MOD 30 MIN: CPT | Performed by: NURSE PRACTITIONER

## 2025-05-13 RX ORDER — FAMOTIDINE 40 MG/1
40 TABLET, FILM COATED ORAL 2 TIMES DAILY
Qty: 180 TABLET | Refills: 3 | Status: SHIPPED | OUTPATIENT
Start: 2025-05-13

## 2025-05-13 RX ORDER — ESOMEPRAZOLE MAGNESIUM 40 MG/1
40 CAPSULE, DELAYED RELEASE ORAL
Qty: 60 CAPSULE | Refills: 0 | Status: SHIPPED | OUTPATIENT
Start: 2025-05-13

## 2025-05-13 NOTE — PROGRESS NOTES
"Chief Complaint   Obrien's Esophagus (1yr follow up, states she has about 3 break through days a week of GERD )    History of Present Illness       Charlotte Paredes is a 30 y.o. female who presents to [facility] for follow-up for GERD. She was last seen in the office by me on 5/9/2024.    Has history of upper abdominal pain with nausea and GERD.  Has been taking Nexium 40 mg every morning and Pepcid 40 mg every night. Still having some breakthrough reflux. Still having LUQ abd pain. Sees pain mgmt for pain injections. Admits the first shots really helped but feels like the most recent shots were not helpful.  Was told recently after having an MRI that she might have a slipped disc in her back.  She is supposed to follow-up with them next week.  Denies any dysphagia, nausea and vomiting, rectal bleeding or melena.  Admits her appetite is good and her weight is stable.        Underwent EGD for surveillance of Obrien's esophagus with Dr. Thomson on 2/23. EGD showed gastritis. Path + Obrien's esophagus without dysplasia. Recall EGD in 3 years.         HIDA scan was done yesterday which was normal.  Normal abdominal ultrasound.  Had CT scan of the abdomen pelvis done last March that was negative for any acute abdominal process.     Reports her bowels  Move well daily. Denies any fiber or probiotics.      Colonoscopy---never had one     GI family history---maternal grandfather with colon cancer.      Doing better but still having breakthrough reflux several times a week. Taking nexium in the morning and pepcid BID.     Results       Result Review :                   Lipase   Lipase   Date Value Ref Range Status   04/28/2024 45 11 - 82 Units/Liter Final   08/25/2021 15 4 - 39 U/L Final     Amylase No results found for: \"AMYLASE\"  Iron Profile No results found for: \"IRON\", \"TIBC\", \"LABIRON\", \"TRANSFERRIN\"  Ferritin No results found for: \"FERRITIN\"  ESR (Sed Rate) No results found for: " "\"SEDRATE\"  CRP (C-Reactive) No results found for: \"CRP\"  Liver Workup   Protime   Date Value Ref Range Status   2022 11.0 9.7 - 11.5 Second Final     Comment:     When using the PT to screen for a coagulopathy, please refer to the PT reference range to evaluate results.     INR   Date Value Ref Range Status   2022 1.0  Final     Comment:     To monitor warfarin, the INR calculated from the PT is used, according to current published guidelines:    *Most indications, moderate intensity INR (2.0-3.0) is effective.    *Patients with recurrent thromboembolic events with a therapeutic INR as above, or other additional risk factors for thromboembolic events, high intensity INR (2.5-3.5) is recommended.    *Optimal target range for the INR is not likely to be the same for all indications, and lower INR targets may be prudent for patients at very high risk of bleeding.    The INR is used to manage patients on warfarin, and thus is not compared to a \"normal\" INR range. The validity of the INR in other conditions of impaired coagulation has not been fully evaluated, and the PT recorded in seconds is recommended in these   instances. Ref:  CHEST 2008 supplement.               Past Medical History       Past Medical History:   Diagnosis Date    Anxiety     Asthma     inhalers prn    Depression     GERD (gastroesophageal reflux disease)     Migraines     Neurocardiogenic syncope     no episodes in 2yrs, only sees pcp    Recurrent tonsillitis     Seasonal allergies        Past Surgical History:   Procedure Laterality Date    ABDOMINAL SURGERY      ex lap     SECTION      x3    ENDOSCOPY N/A 2022    Procedure: ESOPHAGOGASTRODUODENOSCOPY wtih biopsies;  Surgeon: Sirisha Thomson MD;  Location: [facility] ENDOSCOPY;  Service: Gastroenterology;  Laterality: N/A;  esophagitis and gastritis    ENDOSCOPY N/A 2023    Procedure: ESOPHAGOGASTRODUODENOSCOPY WITH BIOPSIES;  Surgeon: Sirisha Thomson " MD Kristin;  Location: [facility] ENDOSCOPY;  Service: Gastroenterology;  Laterality: N/A;  GASTRITIS    UPPER GASTROINTESTINAL ENDOSCOPY           Current Outpatient Medications:     esomeprazole (nexIUM) 40 MG capsule, Take 1 capsule by mouth 2 (Two) Times a Day Before Meals., Disp: 60 capsule, Rfl: 0    famotidine (Pepcid) 40 MG tablet, Take 1 tablet by mouth 2 (Two) Times a Day., Disp: 180 tablet, Rfl: 3    fexofenadine (ALLEGRA) 180 MG tablet, Take 1 tablet by mouth Daily., Disp: , Rfl:     levonorgestrel (MIRENA) 20 MCG/24HR IUD, 1 each by Intrauterine route., Disp: , Rfl:     cyclobenzaprine (FLEXERIL) 5 MG tablet, Take 1 tablet by mouth 3 (Three) Times a Day As Needed for Muscle Spasms. (Patient not taking: Reported on 5/13/2025), Disp: 56 tablet, Rfl: 1    fluticasone (FLONASE) 50 MCG/ACT nasal spray, 1-2 sprays by Each Nare route Daily. (Patient not taking: Reported on 5/13/2025), Disp: 16 g, Rfl: 5    ipratropium-albuterol (DUO-NEB) 0.5-2.5 mg/3 ml nebulizer, , Disp: , Rfl:     lidocaine (Lidoderm) 5 %, Place 1 patch on the skin as directed by provider Daily. Remove & Discard patch within 12 hours or as directed by MD (Patient not taking: Reported on 5/13/2025), Disp: 30 patch, Rfl: 1    ondansetron (Zofran) 4 MG tablet, Take 1 tablet by mouth 4 (Four) Times a Day As Needed for Nausea or Vomiting. (Patient not taking: Reported on 5/13/2025), Disp: 28 tablet, Rfl: 0     Allergies   Allergen Reactions    Cefaclor Hives    Tape Rash       Family History   Problem Relation Age of Onset    Anemia Mother     COPD Mother     Depression Mother     Alcohol abuse Mother     Alcohol abuse Father     Depression Brother     Alcohol abuse Brother     Arthritis Brother     Ovarian cancer Maternal Grandmother     Diabetes type II Maternal Grandmother     Hypertension Maternal Grandmother     Breast cancer Maternal Grandmother     Esophageal cancer Maternal Aunt     COPD Maternal Grandfather     Alcohol abuse Maternal  "Grandfather     Liver cancer Maternal Grandfather     Hyperlipidemia Paternal Grandfather     Hypertension Paternal Grandfather         Social History     Social History Narrative    Not on file       Objective       Review of Systems   Constitutional:  Negative for appetite change, fatigue, fever, unexpected weight gain and unexpected weight loss.   HENT:  Negative for trouble swallowing.    Respiratory:  Negative for cough, choking, chest tightness, shortness of breath, wheezing and stridor.    Cardiovascular:  Negative for chest pain, palpitations and leg swelling.   Gastrointestinal:  Positive for GERD and indigestion. Negative for abdominal distention, abdominal pain, anal bleeding, blood in stool, constipation, diarrhea, nausea, rectal pain and vomiting.        Vital Signs:   /69 (BP Location: Left arm, Patient Position: Sitting)   Pulse 81   Ht 165.1 cm (65\")   Wt 95.3 kg (210 lb 3.2 oz)   SpO2 98%   BMI 34.98 kg/m²       Physical Exam  Constitutional:       General: She is not in acute distress.     Appearance: She is well-developed. She is not ill-appearing.   HENT:      Head: Normocephalic.   Eyes:      Pupils: Pupils are equal, round, and reactive to light.   Cardiovascular:      Rate and Rhythm: Normal rate and regular rhythm.      Heart sounds: Normal heart sounds.   Pulmonary:      Effort: Pulmonary effort is normal.      Breath sounds: Normal breath sounds.   Abdominal:      General: Bowel sounds are normal. There is no distension.      Palpations: Abdomen is soft. There is no mass.      Tenderness: There is no abdominal tenderness. There is no guarding or rebound.      Hernia: No hernia is present.   Musculoskeletal:         General: Normal range of motion.   Skin:     General: Skin is warm and dry.   Neurological:      Mental Status: She is alert and oriented to person, place, and time.   Psychiatric:         Speech: Speech normal.         Behavior: Behavior normal.         Judgment: " Judgment normal.           Assessment & Plan          Assessment and Plan    Diagnoses and all orders for this visit:    1. Gastroesophageal reflux disease with esophagitis without hemorrhage (Primary)  -     esomeprazole (nexIUM) 40 MG capsule; Take 1 capsule by mouth 2 (Two) Times a Day Before Meals.  Dispense: 60 capsule; Refill: 0  -     famotidine (Pepcid) 40 MG tablet; Take 1 tablet by mouth 2 (Two) Times a Day.  Dispense: 180 tablet; Refill: 3    2. Obrien's esophagus without dysplasia    3. Family history of throat cancer      Reflux is definitely better on Nexium 40 mg daily and Pepcid 40 mg twice daily.  Still having breakthrough episodes multiple times a week.  Will increase Nexium to twice daily for 1 month and see how she does.  Continue Pepcid 40 mg twice daily.  Continue GERD precautions.  Patient to continue to work on eliminating foods that she knows aggravates her reflux.  Will need surveillance EGD next February.  Patient to give me an update in 2 to 3 weeks.  Patient to follow-up with me in 6 months.  Patient is agreeable to the plan.          Follow Up       Follow Up   Return in about 6 months (around 11/13/2025) for GERD, BARRETTS.  Patient was given instructions and counseling regarding her condition or for health maintenance advice. Please see specific information pulled into the AVS if appropriate.

## 2025-05-13 NOTE — PATIENT INSTRUCTIONS
Increase nexium to twice a day for now   Continue pepcid twice a day    Give me mychart update in 2-3 weeks

## 2025-05-22 DIAGNOSIS — K21.00 GASTROESOPHAGEAL REFLUX DISEASE WITH ESOPHAGITIS WITHOUT HEMORRHAGE: ICD-10-CM

## 2025-05-22 RX ORDER — ESOMEPRAZOLE MAGNESIUM 40 MG/1
40 CAPSULE, DELAYED RELEASE ORAL DAILY
Qty: 90 CAPSULE | Refills: 3 | Status: SHIPPED | OUTPATIENT
Start: 2025-05-22

## (undated) DEVICE — Device: Brand: DEFENDO AIR/WATER/SUCTION AND BIOPSY VALVE

## (undated) DEVICE — SOL IRRG H2O PL/BG 1000ML STRL

## (undated) DEVICE — SOLIDIFIER LIQLOC PLS 1500CC BT

## (undated) DEVICE — SINGLE-USE BIOPSY FORCEPS: Brand: RADIAL JAW 4

## (undated) DEVICE — BLCK/BITE BLOX WO/DENTL/RIM W/STRAP 54F

## (undated) DEVICE — LINER SURG CANSTR SXN S/RIGD 1500CC

## (undated) DEVICE — CONN JET HYDRA H20 AUXILIARY DISP

## (undated) DEVICE — Device

## (undated) DEVICE — EGD OR ERCP KIT: Brand: MEDLINE INDUSTRIES, INC.